# Patient Record
Sex: MALE | Race: WHITE
[De-identification: names, ages, dates, MRNs, and addresses within clinical notes are randomized per-mention and may not be internally consistent; named-entity substitution may affect disease eponyms.]

---

## 2017-01-19 ENCOUNTER — APPOINTMENT (OUTPATIENT)
Dept: INTERNAL MEDICINE | Facility: CLINIC | Age: 62
End: 2017-01-19

## 2017-03-20 ENCOUNTER — APPOINTMENT (OUTPATIENT)
Dept: INTERNAL MEDICINE | Facility: CLINIC | Age: 62
End: 2017-03-20

## 2017-06-12 ENCOUNTER — APPOINTMENT (OUTPATIENT)
Dept: INTERNAL MEDICINE | Facility: CLINIC | Age: 62
End: 2017-06-12

## 2017-09-18 ENCOUNTER — APPOINTMENT (OUTPATIENT)
Dept: INTERNAL MEDICINE | Facility: CLINIC | Age: 62
End: 2017-09-18
Payer: MEDICARE

## 2017-09-18 PROCEDURE — 90686 IIV4 VACC NO PRSV 0.5 ML IM: CPT

## 2017-09-18 PROCEDURE — G0008: CPT

## 2017-09-18 PROCEDURE — G0009: CPT

## 2017-09-18 PROCEDURE — 90670 PCV13 VACCINE IM: CPT

## 2017-09-18 PROCEDURE — 99214 OFFICE O/P EST MOD 30 MIN: CPT | Mod: 25

## 2017-12-22 ENCOUNTER — APPOINTMENT (OUTPATIENT)
Dept: INTERNAL MEDICINE | Facility: CLINIC | Age: 62
End: 2017-12-22
Payer: MEDICARE

## 2017-12-22 PROCEDURE — 99214 OFFICE O/P EST MOD 30 MIN: CPT | Mod: 25

## 2017-12-22 PROCEDURE — 36415 COLL VENOUS BLD VENIPUNCTURE: CPT

## 2018-03-12 ENCOUNTER — APPOINTMENT (OUTPATIENT)
Dept: INTERNAL MEDICINE | Facility: CLINIC | Age: 63
End: 2018-03-12
Payer: MEDICARE

## 2018-03-12 PROCEDURE — 36415 COLL VENOUS BLD VENIPUNCTURE: CPT

## 2018-03-12 PROCEDURE — 99214 OFFICE O/P EST MOD 30 MIN: CPT | Mod: 25

## 2018-06-11 ENCOUNTER — LABORATORY RESULT (OUTPATIENT)
Age: 63
End: 2018-06-11

## 2018-06-11 ENCOUNTER — APPOINTMENT (OUTPATIENT)
Dept: INTERNAL MEDICINE | Facility: CLINIC | Age: 63
End: 2018-06-11
Payer: MEDICARE

## 2018-06-11 PROCEDURE — 99214 OFFICE O/P EST MOD 30 MIN: CPT | Mod: 25

## 2018-06-11 PROCEDURE — 36415 COLL VENOUS BLD VENIPUNCTURE: CPT

## 2018-08-07 ENCOUNTER — MEDICATION RENEWAL (OUTPATIENT)
Age: 63
End: 2018-08-07

## 2018-08-09 ENCOUNTER — EMERGENCY (EMERGENCY)
Facility: HOSPITAL | Age: 63
LOS: 1 days | Discharge: HOME | End: 2018-08-09
Attending: EMERGENCY MEDICINE
Payer: MEDICARE

## 2018-08-09 ENCOUNTER — RX CHANGE (OUTPATIENT)
Age: 63
End: 2018-08-09

## 2018-08-09 VITALS
RESPIRATION RATE: 18 BRPM | WEIGHT: 199.96 LBS | TEMPERATURE: 98 F | HEIGHT: 74 IN | OXYGEN SATURATION: 100 % | DIASTOLIC BLOOD PRESSURE: 79 MMHG | SYSTOLIC BLOOD PRESSURE: 124 MMHG | HEART RATE: 76 BPM

## 2018-08-09 PROCEDURE — 99283 EMERGENCY DEPT VISIT LOW MDM: CPT

## 2018-08-09 RX ORDER — VALACYCLOVIR 500 MG/1
1 TABLET, FILM COATED ORAL
Qty: 21 | Refills: 0 | OUTPATIENT
Start: 2018-08-09 | End: 2018-08-15

## 2018-08-09 NOTE — ED ADULT TRIAGE NOTE - CHIEF COMPLAINT QUOTE
States called PMD with pain and rash on back and under right arm pit. Denies fevers and chills. Reports non-hodgkins lymphoma.

## 2018-08-10 ENCOUNTER — RECORD ABSTRACTING (OUTPATIENT)
Age: 63
End: 2018-08-10

## 2018-08-10 DIAGNOSIS — Z78.9 OTHER SPECIFIED HEALTH STATUS: ICD-10-CM

## 2018-08-10 DIAGNOSIS — Z87.891 PERSONAL HISTORY OF NICOTINE DEPENDENCE: ICD-10-CM

## 2018-08-10 DIAGNOSIS — Z86.73 PERSONAL HISTORY OF TRANSIENT ISCHEMIC ATTACK (TIA), AND CEREBRAL INFARCTION W/OUT RESIDUAL DEFICITS: ICD-10-CM

## 2018-08-10 DIAGNOSIS — R21 RASH AND OTHER NONSPECIFIC SKIN ERUPTION: ICD-10-CM

## 2018-08-10 RX ORDER — MONTELUKAST SODIUM 10 MG/1
10 TABLET, FILM COATED ORAL DAILY
Refills: 0 | Status: ACTIVE | COMMUNITY

## 2018-08-15 ENCOUNTER — APPOINTMENT (OUTPATIENT)
Dept: INTERNAL MEDICINE | Facility: CLINIC | Age: 63
End: 2018-08-15
Payer: MEDICARE

## 2018-08-15 VITALS
DIASTOLIC BLOOD PRESSURE: 70 MMHG | BODY MASS INDEX: 25.54 KG/M2 | HEIGHT: 74 IN | WEIGHT: 199 LBS | SYSTOLIC BLOOD PRESSURE: 120 MMHG

## 2018-08-15 PROCEDURE — 99214 OFFICE O/P EST MOD 30 MIN: CPT

## 2018-08-15 NOTE — PHYSICAL EXAM
[Normal] : normal gait, coordination grossly intact, no focal deficits [de-identified] : RIGHT DERMATOMAL ZOSTER

## 2018-08-15 NOTE — PLAN
[FreeTextEntry1] : Patient is a 62-year-old chronic pain  severe osteoarthritis- history of CVA n the past history of CLL now without shingles he went to emergency room and was given Valtrex clinically appears to be healing and scabbing over no active draining vesicles are present will complete Valtrex and followup next week. PT IS  anxious  has an upcoming [employer]

## 2018-08-15 NOTE — HISTORY OF PRESENT ILLNESS
[FreeTextEntry1] : F/U  on Shingles [de-identified] : PT HERE FOR FOLLOW UP HAD SHINGLES LAST THURSDAY  WENT TO ER AND WAS GIVEN VALTREX \par HERE FOR FOLLOW UP\par FEELS BETTER

## 2018-08-17 NOTE — ED PROVIDER NOTE - OBJECTIVE STATEMENT
63 yo male pmh lymphoma comes to ed with rash on rt side of chest , axilla and back; denies fever , chills, nausea , vomting and sob

## 2018-08-22 ENCOUNTER — APPOINTMENT (OUTPATIENT)
Dept: INTERNAL MEDICINE | Facility: CLINIC | Age: 63
End: 2018-08-22
Payer: MEDICARE

## 2018-08-22 VITALS
HEIGHT: 74 IN | WEIGHT: 197 LBS | DIASTOLIC BLOOD PRESSURE: 84 MMHG | HEART RATE: 63 BPM | BODY MASS INDEX: 25.28 KG/M2 | SYSTOLIC BLOOD PRESSURE: 145 MMHG

## 2018-08-22 PROCEDURE — 99213 OFFICE O/P EST LOW 20 MIN: CPT

## 2018-08-22 NOTE — PHYSICAL EXAM
[Normal] : normal gait, coordination grossly intact, no focal deficits [de-identified] : ZOSTER RASH HEALING DRYING

## 2018-08-22 NOTE — PLAN
[FreeTextEntry1] : Patient is a 62-year-old chronic pain  severe osteoarthritis- history of CVA n the past history of CLL now without shingles he went to emergency room and was given Valtrex clinically appears to be healing and scabbing over no active draining vesicles are present has completed without oral lesions or scaling over

## 2018-08-22 NOTE — HISTORY OF PRESENT ILLNESS
[FreeTextEntry1] : recheck for shingles [de-identified] : PT HERE FOR FOLLOW UP HAD SHINGLES LAST THURSDAY  WENT TO ER AND WAS GIVEN VALTREX \par HERE FOR FOLLOW UP\par FEELS BETTER rash resolving

## 2018-09-20 ENCOUNTER — APPOINTMENT (OUTPATIENT)
Dept: INTERNAL MEDICINE | Facility: CLINIC | Age: 63
End: 2018-09-20
Payer: MEDICARE

## 2018-09-20 VITALS
WEIGHT: 199 LBS | DIASTOLIC BLOOD PRESSURE: 85 MMHG | BODY MASS INDEX: 25.54 KG/M2 | HEIGHT: 74 IN | SYSTOLIC BLOOD PRESSURE: 120 MMHG

## 2018-09-20 DIAGNOSIS — B02.9 ZOSTER W/OUT COMPLICATIONS: ICD-10-CM

## 2018-09-20 PROCEDURE — 90686 IIV4 VACC NO PRSV 0.5 ML IM: CPT

## 2018-09-20 PROCEDURE — G0008: CPT

## 2018-09-20 PROCEDURE — 99214 OFFICE O/P EST MOD 30 MIN: CPT | Mod: 25

## 2018-09-20 NOTE — PLAN
[FreeTextEntry1] : Patient is a 62-year-old chronic pain  severe osteoarthritis- history of CVA n the past history of CLL  SEES ONCOLOGY\par OVERALL IMPROVED MINIMAL PAIN OF PHN\par FLU SHOT GIVEN HIGH DOSE

## 2018-09-20 NOTE — HISTORY OF PRESENT ILLNESS
[FreeTextEntry1] : F/U on B/P & Shingles [de-identified] : PT HERE FOR FOLLOW UP FEELS OK NO MAJOR COMPLAINTS \par PAIN FORM SHINGLES MINIMAL \par HAS APPTS WITH ONCOLOGISTS UPCOMING\par PAIN UNDER CONTROL ON PAIN MEDS

## 2018-10-31 ENCOUNTER — MEDICATION RENEWAL (OUTPATIENT)
Age: 63
End: 2018-10-31

## 2018-11-02 RX ORDER — LOSARTAN POTASSIUM 100 MG/1
100 TABLET, FILM COATED ORAL DAILY
Qty: 90 | Refills: 3 | Status: COMPLETED | COMMUNITY
End: 2018-11-02

## 2018-12-21 ENCOUNTER — APPOINTMENT (OUTPATIENT)
Dept: INTERNAL MEDICINE | Facility: CLINIC | Age: 63
End: 2018-12-21
Payer: MEDICARE

## 2018-12-21 PROCEDURE — 36415 COLL VENOUS BLD VENIPUNCTURE: CPT

## 2018-12-21 PROCEDURE — 99214 OFFICE O/P EST MOD 30 MIN: CPT | Mod: 25

## 2018-12-22 NOTE — PLAN
[FreeTextEntry1] : Patient is a 62-year-old chronic pain  severe osteoarthritis- history of CVA n the past history of CLL  SEES ONCOLOGY\par OVERALL IMPROVED MINIMAL PAIN OF PHN\par LAB WORK FROM ONCOLOGIST REVIEWED WITH PT AND FSG \par WILL CHECK LABS NOT DOEN BY ONC\par WILL FOLLOW UP

## 2018-12-22 NOTE — HISTORY OF PRESENT ILLNESS
[FreeTextEntry1] : FOLLOW UP BP  [de-identified] : PT HERE FOR FOLLOW UP FEELS OK NO MAJOR COMPLAINTS \par PAIN FORM SHINGLESRESOLVED \par HAD  APPTS WITH ONCOLOGISTS AND BGINGS IN LABS AND ALSO FINGERSTICKS \par PAIN UNDER CONTROL ON PAIN MEDS NOT DUE TILL FEBRUARY

## 2018-12-28 ENCOUNTER — MEDICATION RENEWAL (OUTPATIENT)
Age: 63
End: 2018-12-28

## 2018-12-31 ENCOUNTER — MEDICATION RENEWAL (OUTPATIENT)
Age: 63
End: 2018-12-31

## 2019-01-13 LAB
25(OH)D3 SERPL-MCNC: 15 NG/ML
CHOLEST SERPL-MCNC: 117 MG/DL
CHOLEST/HDLC SERPL: 4 RATIO
HBA1C MFR BLD HPLC: 6.4 %
HDLC SERPL-MCNC: 29 MG/DL
LDLC SERPL CALC-MCNC: 60 MG/DL
TRIGL SERPL-MCNC: 139 MG/DL

## 2019-01-14 ENCOUNTER — MED ADMIN CHARGE (OUTPATIENT)
Age: 64
End: 2019-01-14

## 2019-01-28 ENCOUNTER — MEDICATION RENEWAL (OUTPATIENT)
Age: 64
End: 2019-01-28

## 2019-03-05 ENCOUNTER — APPOINTMENT (OUTPATIENT)
Dept: INTERNAL MEDICINE | Facility: CLINIC | Age: 64
End: 2019-03-05
Payer: MEDICARE

## 2019-03-05 VITALS
SYSTOLIC BLOOD PRESSURE: 140 MMHG | DIASTOLIC BLOOD PRESSURE: 80 MMHG | WEIGHT: 200 LBS | BODY MASS INDEX: 25.67 KG/M2 | HEIGHT: 74 IN

## 2019-03-05 PROCEDURE — 36415 COLL VENOUS BLD VENIPUNCTURE: CPT

## 2019-03-05 PROCEDURE — 99214 OFFICE O/P EST MOD 30 MIN: CPT | Mod: 25

## 2019-03-05 NOTE — HISTORY OF PRESENT ILLNESS
[FreeTextEntry1] : F/U  on  B/P  &  other Medical Issues [de-identified] : PT HERE FOR FLLOW UP FEELS K SEES ONCOLOGIST HAD SOME LABS THERE PT WITH CHRONIC PAIN FROM DJD AND HAS BEEN ON CHRONIC OPIATES HE DOESNT ABUSE THEM AND NO RELIEF FORM PAIN MANAGEMENT \par OVERALL FEELING OK BRINGS IN GLUCOSE LOG \par

## 2019-03-05 NOTE — PLAN
[FreeTextEntry1] : Patient is a 62-year-old chronic pain  severe osteoarthritis- history of CVA n the past history of CLL  SEES ONCOLOGY\par OVERALL IMPROVED \par LAB WORK FROM ONCOLOGIST REVIEWED WITH PT AND FSG \par WILL CHECK LABS NOT DONE  BY ONC\par PT ON CHRONIC PAIN MEDS VICODIN HE DOESN'T ABUSE  THEM  AND HE SUFFESR FROM CHRONIC DJD

## 2019-03-12 LAB
25(OH)D3 SERPL-MCNC: 47.5 NG/ML
CHOLEST SERPL-MCNC: 135 MG/DL
CHOLEST/HDLC SERPL: 4.4 RATIO
HBA1C MFR BLD HPLC: 6.2 %
HDLC SERPL-MCNC: 31 MG/DL
LDLC SERPL CALC-MCNC: 80 MG/DL
TRIGL SERPL-MCNC: 121 MG/DL

## 2019-04-02 ENCOUNTER — APPOINTMENT (OUTPATIENT)
Dept: INTERNAL MEDICINE | Facility: CLINIC | Age: 64
End: 2019-04-02
Payer: MEDICARE

## 2019-04-02 VITALS
SYSTOLIC BLOOD PRESSURE: 130 MMHG | BODY MASS INDEX: 25.67 KG/M2 | WEIGHT: 200 LBS | DIASTOLIC BLOOD PRESSURE: 80 MMHG | HEIGHT: 74 IN

## 2019-04-02 DIAGNOSIS — R10.9 UNSPECIFIED ABDOMINAL PAIN: ICD-10-CM

## 2019-04-02 PROCEDURE — 99214 OFFICE O/P EST MOD 30 MIN: CPT

## 2019-04-02 NOTE — PLAN
[FreeTextEntry1] : Patient is a 62-year-old chronic pain  severe osteoarthritis- history of CVA n the past history of CLL  SEES ONCOLOGY\par OVERALL IMPROVED \par HAS PAIN AND SWELLING BEHIND RIGHT EAR\par MILD ERYTHEMA  MAY BE FOLLICULITIS WITH REACTIVE LN BUT WITH HX OF CLL WILL NEED TO SEE IF IT RESPONDS TO ABX THERAPY AND WARM COMPRESSES  FOLLOW UP NEXT WEEK IF NO CHANGE SEE ONCOLOGIST AS POTENTIAL FOR LYMPHOMA

## 2019-04-02 NOTE — PHYSICAL EXAM
[No Acute Distress] : no acute distress [Well Nourished] : well nourished [Well Developed] : well developed [Well-Appearing] : well-appearing [Normal Sclera/Conjunctiva] : normal sclera/conjunctiva [PERRL] : pupils equal round and reactive to light [EOMI] : extraocular movements intact [Normal Oropharynx] : the oropharynx was normal [No JVD] : no jugular venous distention [Supple] : supple [No Lymphadenopathy] : no lymphadenopathy [Thyroid Normal, No Nodules] : the thyroid was normal and there were no nodules present [No Respiratory Distress] : no respiratory distress  [Clear to Auscultation] : lungs were clear to auscultation bilaterally [No Accessory Muscle Use] : no accessory muscle use [Normal Rate] : normal rate  [Regular Rhythm] : with a regular rhythm [Normal S1, S2] : normal S1 and S2 [No Murmur] : no murmur heard [No Carotid Bruits] : no carotid bruits [No Abdominal Bruit] : a ~M bruit was not heard ~T in the abdomen [No Varicosities] : no varicosities [Pedal Pulses Present] : the pedal pulses are present [No Edema] : there was no peripheral edema [No Extremity Clubbing/Cyanosis] : no extremity clubbing/cyanosis [No Palpable Aorta] : no palpable aorta [Soft] : abdomen soft [Non Tender] : non-tender [Non-distended] : non-distended [No Masses] : no abdominal mass palpated [No HSM] : no HSM [Normal Bowel Sounds] : normal bowel sounds [Normal Posterior Cervical Nodes] : no posterior cervical lymphadenopathy [Normal Anterior Cervical Nodes] : no anterior cervical lymphadenopathy [No CVA Tenderness] : no CVA  tenderness [No Spinal Tenderness] : no spinal tenderness [No Joint Swelling] : no joint swelling [Grossly Normal Strength/Tone] : grossly normal strength/tone [No Rash] : no rash [Normal Gait] : normal gait [Coordination Grossly Intact] : coordination grossly intact [No Focal Deficits] : no focal deficits [Deep Tendon Reflexes (DTR)] : deep tendon reflexes were 2+ and symmetric [Normal Affect] : the affect was normal [Normal Insight/Judgement] : insight and judgment were intact [de-identified] : POSTERIOR RIGHT EAR WITH EDMEA ?LN MILD ERYTHEMA

## 2019-04-11 ENCOUNTER — APPOINTMENT (OUTPATIENT)
Dept: INTERNAL MEDICINE | Facility: CLINIC | Age: 64
End: 2019-04-11
Payer: MEDICARE

## 2019-04-11 PROCEDURE — 99213 OFFICE O/P EST LOW 20 MIN: CPT

## 2019-04-12 NOTE — PLAN
[FreeTextEntry1] : Patient is a 62-year-old chronic pain  severe osteoarthritis- history of CVA n the past history of CLL  SEES ONCOLOGY\par OVERALL IMPROVED \par HAD PAIN AND SWELLING BEHIND RIGHT EAR\par COMPLETED ABX AND WARM COMPRESSES WITH ALMOST RESOLTUION\par HE SAW ONCOLOGIST DIDNT FEEL IT WAS RELATED TO CLL\par OVERALL IMPROVED  FOLLOW UP PRN

## 2019-04-12 NOTE — HISTORY OF PRESENT ILLNESS
[de-identified] : PT HERE FOR FOLLOWUP HAD COMPLAINTS OF BUMP BEHIND RIGHT EAR  DEVELOPED  AGO NO TRAUMA THOUGHTIT MIGHT BE SPIDER BITE TREATED WITH ORAL DURICEF HER FOR FOLLOW UP  [FreeTextEntry1] : LEFT EAR SWELLING

## 2019-05-03 ENCOUNTER — OUTPATIENT (OUTPATIENT)
Dept: OUTPATIENT SERVICES | Facility: HOSPITAL | Age: 64
LOS: 1 days | End: 2019-05-03
Payer: MEDICARE

## 2019-05-03 ENCOUNTER — APPOINTMENT (OUTPATIENT)
Dept: RADIOLOGY | Facility: CLINIC | Age: 64
End: 2019-05-03
Payer: MEDICARE

## 2019-05-03 DIAGNOSIS — M89.9 DISORDER OF BONE, UNSPECIFIED: ICD-10-CM

## 2019-05-03 DIAGNOSIS — C83.05: ICD-10-CM

## 2019-05-03 DIAGNOSIS — M15.0 PRIMARY GENERALIZED (OSTEO)ARTHRITIS: ICD-10-CM

## 2019-05-03 DIAGNOSIS — Z00.00 ENCOUNTER FOR GENERAL ADULT MEDICAL EXAMINATION WITHOUT ABNORMAL FINDINGS: ICD-10-CM

## 2019-05-03 PROCEDURE — 72100 X-RAY EXAM L-S SPINE 2/3 VWS: CPT

## 2019-05-03 PROCEDURE — 72100 X-RAY EXAM L-S SPINE 2/3 VWS: CPT | Mod: 26

## 2019-05-03 PROCEDURE — 77080 DXA BONE DENSITY AXIAL: CPT | Mod: 26

## 2019-05-03 PROCEDURE — 77080 DXA BONE DENSITY AXIAL: CPT

## 2019-06-05 ENCOUNTER — APPOINTMENT (OUTPATIENT)
Dept: INTERNAL MEDICINE | Facility: CLINIC | Age: 64
End: 2019-06-05
Payer: MEDICARE

## 2019-06-05 VITALS
SYSTOLIC BLOOD PRESSURE: 130 MMHG | HEIGHT: 62 IN | WEIGHT: 202 LBS | BODY MASS INDEX: 37.17 KG/M2 | DIASTOLIC BLOOD PRESSURE: 80 MMHG

## 2019-06-05 PROCEDURE — 99214 OFFICE O/P EST MOD 30 MIN: CPT

## 2019-06-05 NOTE — PHYSICAL EXAM
[No Acute Distress] : no acute distress [Well Nourished] : well nourished [Well Developed] : well developed [Well-Appearing] : well-appearing [Normal Sclera/Conjunctiva] : normal sclera/conjunctiva [EOMI] : extraocular movements intact [PERRL] : pupils equal round and reactive to light [Normal Outer Ear/Nose] : the outer ears and nose were normal in appearance [Normal Oropharynx] : the oropharynx was normal [No JVD] : no jugular venous distention [Supple] : supple [Thyroid Normal, No Nodules] : the thyroid was normal and there were no nodules present [No Lymphadenopathy] : no lymphadenopathy [No Respiratory Distress] : no respiratory distress  [Clear to Auscultation] : lungs were clear to auscultation bilaterally [No Accessory Muscle Use] : no accessory muscle use [Normal Rate] : normal rate  [Regular Rhythm] : with a regular rhythm [Normal S1, S2] : normal S1 and S2 [No Murmur] : no murmur heard [No Carotid Bruits] : no carotid bruits [No Abdominal Bruit] : a ~M bruit was not heard ~T in the abdomen [No Varicosities] : no varicosities [Pedal Pulses Present] : the pedal pulses are present [No Edema] : there was no peripheral edema [No Extremity Clubbing/Cyanosis] : no extremity clubbing/cyanosis [No Palpable Aorta] : no palpable aorta [Soft] : abdomen soft [Non Tender] : non-tender [Non-distended] : non-distended [No Masses] : no abdominal mass palpated [No HSM] : no HSM [Normal Posterior Cervical Nodes] : no posterior cervical lymphadenopathy [Normal Bowel Sounds] : normal bowel sounds [No CVA Tenderness] : no CVA  tenderness [Normal Anterior Cervical Nodes] : no anterior cervical lymphadenopathy [No Spinal Tenderness] : no spinal tenderness [No Joint Swelling] : no joint swelling [Grossly Normal Strength/Tone] : grossly normal strength/tone [No Rash] : no rash [Coordination Grossly Intact] : coordination grossly intact [Normal Gait] : normal gait [Normal Affect] : the affect was normal [Deep Tendon Reflexes (DTR)] : deep tendon reflexes were 2+ and symmetric [No Focal Deficits] : no focal deficits [Normal Insight/Judgement] : insight and judgment were intact

## 2019-06-05 NOTE — HISTORY OF PRESENT ILLNESS
[FreeTextEntry1] : Follow up on Glucose & Medical  Evaluation [de-identified] : PT HERE FOR FOLLOWUP  OVERALL FEELING OK HAD LABS LAST VIIST

## 2019-06-05 NOTE — PLAN
[FreeTextEntry1] : Patient is a 62-year-old chronic pain  severe osteoarthritis- history of CVA n the past history of CLL  SEES ONCOLOGY\par OVERALL IMPROVED \par  ON CHRONIC PAIN MEDS FOR DJD DOESNT  ABUSE THEM AND AND FEELS  OK \par  DIABETIC ON DIET \par OVERALL DOING OK WILL CONTINEU PRESENT REGIMEN REVIEWED LABS WITH PT FORM LAST TIME

## 2019-07-24 ENCOUNTER — MEDICATION RENEWAL (OUTPATIENT)
Age: 64
End: 2019-07-24

## 2019-07-25 RX ORDER — BLOOD SUGAR DIAGNOSTIC
STRIP MISCELLANEOUS TWICE DAILY
Qty: 180 | Refills: 3 | Status: DISCONTINUED | COMMUNITY
End: 2019-07-25

## 2019-07-25 RX ORDER — LANCETS
EACH MISCELLANEOUS
Qty: 100 | Refills: 3 | Status: DISCONTINUED | COMMUNITY
End: 2019-07-25

## 2019-07-29 RX ORDER — BLOOD SUGAR DIAGNOSTIC
STRIP MISCELLANEOUS DAILY
Qty: 1 | Refills: 2 | Status: ACTIVE | COMMUNITY
Start: 2019-07-25 | End: 1900-01-01

## 2019-09-11 ENCOUNTER — APPOINTMENT (OUTPATIENT)
Dept: INTERNAL MEDICINE | Facility: CLINIC | Age: 64
End: 2019-09-11
Payer: MEDICARE

## 2019-09-11 VITALS
DIASTOLIC BLOOD PRESSURE: 78 MMHG | BODY MASS INDEX: 25.93 KG/M2 | WEIGHT: 202 LBS | SYSTOLIC BLOOD PRESSURE: 131 MMHG | TEMPERATURE: 66 F | HEIGHT: 74 IN

## 2019-09-11 PROCEDURE — 36415 COLL VENOUS BLD VENIPUNCTURE: CPT

## 2019-09-11 PROCEDURE — 99214 OFFICE O/P EST MOD 30 MIN: CPT | Mod: 25

## 2019-09-11 NOTE — HISTORY OF PRESENT ILLNESS
[FreeTextEntry1] : follow up [de-identified] : PT HERE FOR FOLLOWUP  OVERALL FEELING OK HAD LABS LAST VISIT REVIEWED\par HAS BEEN BACK TO ONCOLOGIST AT [facility] FOR CLL  AND BROUGHT IN SOME LABS \par OVERALL FEELS OK ENJOYING [facility]

## 2019-09-11 NOTE — PLAN
[FreeTextEntry1] : PT HERE FOR FOLLOWUP  OVERALL FEELING OK HAD LABS LAST VISIT REVIEWED\par HAS BEEN BACK TO ONCOLOGIST AT [facility] FOR CLL  AND BROUGHT IN SOME LABS \par OVERALL FEELS OK ENJOYING [employer]SON \par WBRINGS IN FS[place] AT HOME IN [place] ON METFORMN TWICE A DAY\par OVERALL FEELS OK\par ELOISE CHEK HG A1C AND LIPIS ALSO PSA IS DUE\par LABS  DRAWN HERE\par RENEWED PAIN MEDS PT WITH CHRONIC DJD DOESN T ABUSE THEM\par WILL FOLLOW UP\par MAY NEED EVENTUAL KNNEE PROCEDURE WITH DR ROSENTHAL

## 2019-09-24 ENCOUNTER — RESULT REVIEW (OUTPATIENT)
Age: 64
End: 2019-09-24

## 2019-09-24 LAB
25(OH)D3 SERPL-MCNC: 49 NG/ML
CHOLEST SERPL-MCNC: 117 MG/DL
CHOLEST/HDLC SERPL: 3.9 RATIO
ESTIMATED AVERAGE GLUCOSE: 137 MG/DL
HBA1C MFR BLD HPLC: 6.4 %
HDLC SERPL-MCNC: 30 MG/DL
LDLC SERPL CALC-MCNC: 66 MG/DL
PSA SERPL-MCNC: 0.17 NG/ML
TRIGL SERPL-MCNC: 107 MG/DL

## 2019-11-06 ENCOUNTER — APPOINTMENT (OUTPATIENT)
Dept: INTERNAL MEDICINE | Facility: CLINIC | Age: 64
End: 2019-11-06
Payer: MEDICARE

## 2019-11-06 VITALS
BODY MASS INDEX: 25.93 KG/M2 | SYSTOLIC BLOOD PRESSURE: 110 MMHG | HEIGHT: 74 IN | WEIGHT: 202 LBS | DIASTOLIC BLOOD PRESSURE: 70 MMHG

## 2019-11-06 DIAGNOSIS — J06.9 ACUTE UPPER RESPIRATORY INFECTION, UNSPECIFIED: ICD-10-CM

## 2019-11-06 PROCEDURE — 99214 OFFICE O/P EST MOD 30 MIN: CPT

## 2019-11-07 NOTE — PHYSICAL EXAM
[No Acute Distress] : no acute distress [Well Nourished] : well nourished [Well Developed] : well developed [Well-Appearing] : well-appearing [Normal Sclera/Conjunctiva] : normal sclera/conjunctiva [PERRL] : pupils equal round and reactive to light [EOMI] : extraocular movements intact [Normal Outer Ear/Nose] : the outer ears and nose were normal in appearance [Normal Oropharynx] : the oropharynx was normal [No JVD] : no jugular venous distention [No Lymphadenopathy] : no lymphadenopathy [Supple] : supple [Thyroid Normal, No Nodules] : the thyroid was normal and there were no nodules present [No Respiratory Distress] : no respiratory distress  [No Accessory Muscle Use] : no accessory muscle use [Clear to Auscultation] : lungs were clear to auscultation bilaterally [Normal Rate] : normal rate  [Regular Rhythm] : with a regular rhythm [Normal S1, S2] : normal S1 and S2 [No Murmur] : no murmur heard [No Carotid Bruits] : no carotid bruits [No Abdominal Bruit] : a ~M bruit was not heard ~T in the abdomen [No Varicosities] : no varicosities [Pedal Pulses Present] : the pedal pulses are present [No Edema] : there was no peripheral edema [No Palpable Aorta] : no palpable aorta [No Extremity Clubbing/Cyanosis] : no extremity clubbing/cyanosis [Soft] : abdomen soft [Non Tender] : non-tender [Non-distended] : non-distended [No Masses] : no abdominal mass palpated [No HSM] : no HSM [Normal Bowel Sounds] : normal bowel sounds [Normal Posterior Cervical Nodes] : no posterior cervical lymphadenopathy [Normal Anterior Cervical Nodes] : no anterior cervical lymphadenopathy [No CVA Tenderness] : no CVA  tenderness [No Spinal Tenderness] : no spinal tenderness [No Joint Swelling] : no joint swelling [Grossly Normal Strength/Tone] : grossly normal strength/tone [No Rash] : no rash [Coordination Grossly Intact] : coordination grossly intact [No Focal Deficits] : no focal deficits [Normal Gait] : normal gait [Deep Tendon Reflexes (DTR)] : deep tendon reflexes were 2+ and symmetric [Normal Affect] : the affect was normal [Normal Insight/Judgement] : insight and judgment were intact [de-identified] : POST NASAL DRIP SEEN [de-identified] : POSITIVE MOBILE RIGHT SUPRACLAVICULAR LN

## 2019-11-07 NOTE — HISTORY OF PRESENT ILLNESS
[FreeTextEntry1] : Cough For  over a Week [de-identified] : PT WTH RECENT SORE THROAT URI WAS PLACED ON ABX HERE FOR FOLLOW UP FEELS BETTER BUT HE IS CONCERNED  BECAUSE OF HIS HISTORY OF LYMPHOMA

## 2019-11-07 NOTE — PLAN
[FreeTextEntry1] : PT WTH RECENT SORE THROAT URI WAS PLACED ON ABX HERE FOR FOLLOW UP FEELS BETTER BUT HE IS CONCERNED  BECAUSE OF HIS HISTORY OF LYMPHOMA NO CERVICAL LN SOME POST NASAL DRIP DEFER FURTHER ABX \par PHYSICAL EXAM BENIGN DOES HAVE LN MOBILE IN RIGHT SUPRACLAVICULAR REGION AND TO FOLLO WP WITH ONCOLOGY

## 2019-11-18 ENCOUNTER — OUTPATIENT (OUTPATIENT)
Dept: OUTPATIENT SERVICES | Facility: HOSPITAL | Age: 64
LOS: 1 days | End: 2019-11-18
Payer: MEDICARE

## 2019-11-18 ENCOUNTER — APPOINTMENT (OUTPATIENT)
Dept: RADIOLOGY | Facility: CLINIC | Age: 64
End: 2019-11-18
Payer: MEDICARE

## 2019-11-18 DIAGNOSIS — M15.0 PRIMARY GENERALIZED (OSTEO)ARTHRITIS: ICD-10-CM

## 2019-11-18 PROCEDURE — 73560 X-RAY EXAM OF KNEE 1 OR 2: CPT

## 2019-11-18 PROCEDURE — 73560 X-RAY EXAM OF KNEE 1 OR 2: CPT | Mod: 26,50

## 2019-12-05 ENCOUNTER — APPOINTMENT (OUTPATIENT)
Dept: INTERNAL MEDICINE | Facility: CLINIC | Age: 64
End: 2019-12-05
Payer: MEDICARE

## 2019-12-05 VITALS
BODY MASS INDEX: 27.36 KG/M2 | DIASTOLIC BLOOD PRESSURE: 80 MMHG | HEIGHT: 72 IN | SYSTOLIC BLOOD PRESSURE: 120 MMHG | WEIGHT: 202 LBS

## 2019-12-05 DIAGNOSIS — Z23 ENCOUNTER FOR IMMUNIZATION: ICD-10-CM

## 2019-12-05 PROCEDURE — G0009: CPT

## 2019-12-05 PROCEDURE — 90732 PPSV23 VACC 2 YRS+ SUBQ/IM: CPT

## 2019-12-05 PROCEDURE — 99214 OFFICE O/P EST MOD 30 MIN: CPT | Mod: 25

## 2019-12-05 NOTE — PHYSICAL EXAM
[No Acute Distress] : no acute distress [Well Nourished] : well nourished [Well Developed] : well developed [Well-Appearing] : well-appearing [Normal Sclera/Conjunctiva] : normal sclera/conjunctiva [PERRL] : pupils equal round and reactive to light [EOMI] : extraocular movements intact [Normal Outer Ear/Nose] : the outer ears and nose were normal in appearance [Normal Oropharynx] : the oropharynx was normal [No JVD] : no jugular venous distention [No Lymphadenopathy] : no lymphadenopathy [Supple] : supple [Thyroid Normal, No Nodules] : the thyroid was normal and there were no nodules present [No Respiratory Distress] : no respiratory distress  [No Accessory Muscle Use] : no accessory muscle use [Clear to Auscultation] : lungs were clear to auscultation bilaterally [Normal Rate] : normal rate  [Regular Rhythm] : with a regular rhythm [Normal S1, S2] : normal S1 and S2 [No Murmur] : no murmur heard [No Carotid Bruits] : no carotid bruits [No Abdominal Bruit] : a ~M bruit was not heard ~T in the abdomen [No Varicosities] : no varicosities [No Edema] : there was no peripheral edema [Pedal Pulses Present] : the pedal pulses are present [No Palpable Aorta] : no palpable aorta [No Extremity Clubbing/Cyanosis] : no extremity clubbing/cyanosis [Soft] : abdomen soft [Non-distended] : non-distended [Non Tender] : non-tender [No HSM] : no HSM [Normal Bowel Sounds] : normal bowel sounds [No Masses] : no abdominal mass palpated [No CVA Tenderness] : no CVA  tenderness [Normal Posterior Cervical Nodes] : no posterior cervical lymphadenopathy [No Spinal Tenderness] : no spinal tenderness [Grossly Normal Strength/Tone] : grossly normal strength/tone [No Joint Swelling] : no joint swelling [Coordination Grossly Intact] : coordination grossly intact [No Rash] : no rash [Deep Tendon Reflexes (DTR)] : deep tendon reflexes were 2+ and symmetric [Normal Gait] : normal gait [No Focal Deficits] : no focal deficits [Normal Affect] : the affect was normal [Normal Insight/Judgement] : insight and judgment were intact [de-identified] : POST NASAL DRIP SEEN [de-identified] : POSITIVE MOBILE RIGHT SUPRACLAVICULAR LN

## 2019-12-05 NOTE — PLAN
[FreeTextEntry1] :  PT HERE FOR FOLLOW UP HX OF  CVA CLL ALSO WITH CHRONIC PAIN   DJD ARTHRITIS ON VICODIN FOR YEARS IS NOT ABUSING THEM  SEE RHEUMATOLOGIST \par PHYSICAL EXAM BENIGN DOES HAVE LN MOBILE IN RIGHT SUPRACLAVICULAR REGION AND TO FOLLO WP WITH ONCOLOGY\par WILL CHECK LABS  NEXT TIME\par BRINGS IN LAST LABS FORM ONCOLOGISTS\par WILL HAVE REPEAT SCANS NEXT MONTH TO FURHTER EVAL THE LN ON RIGHT SIDE \par BriNgs IN [place]\par PNEUMOVAX 23 GIVEN TODAY\par

## 2019-12-05 NOTE — HISTORY OF PRESENT ILLNESS
[de-identified] : PT HERE FOR FOLLOW UP FEELS OK HAD LN IN RIGHT SUPRACLAVICULAR AREA WHICH  IS STILL  PRESENT BUT HAS Decreased IN SIZE IT IS MOBILE SOFT NON TENDER\par OVERAL FEELS OK [FreeTextEntry1] : Follow Up On  B/P  &  other Medical Problems

## 2020-01-06 ENCOUNTER — MEDICATION RENEWAL (OUTPATIENT)
Age: 65
End: 2020-01-06

## 2020-03-12 ENCOUNTER — APPOINTMENT (OUTPATIENT)
Dept: INTERNAL MEDICINE | Facility: CLINIC | Age: 65
End: 2020-03-12
Payer: MEDICARE

## 2020-03-12 VITALS
WEIGHT: 203 LBS | DIASTOLIC BLOOD PRESSURE: 80 MMHG | SYSTOLIC BLOOD PRESSURE: 130 MMHG | BODY MASS INDEX: 27.5 KG/M2 | HEIGHT: 72 IN

## 2020-03-12 DIAGNOSIS — E78.1 PURE HYPERGLYCERIDEMIA: ICD-10-CM

## 2020-03-12 PROCEDURE — 99214 OFFICE O/P EST MOD 30 MIN: CPT | Mod: 25

## 2020-03-12 PROCEDURE — 36415 COLL VENOUS BLD VENIPUNCTURE: CPT

## 2020-03-12 RX ORDER — METFORMIN HYDROCHLORIDE 500 MG/1
500 TABLET, COATED ORAL TWICE DAILY
Qty: 180 | Refills: 3 | Status: DISCONTINUED | COMMUNITY
Start: 2019-06-11 | End: 2020-03-12

## 2020-03-12 NOTE — PLAN
[FreeTextEntry1] :  PT HERE FOR FOLLOW UP HX OF  CVA CLL ALSO WITH CHRONIC PAIN   DJD ARTHRITIS ON VICODIN FOR YEARS IS NOT ABUSING THEM  SEE RHEUMATOLOGIST \par  \par WILL CHECK LABS  E\par BRINGS IN LAST  CT FROM ONCOLOGISTS\par OFF METFORMIN WILL CHECK HGA1C \par Bri[place]gs IN [place]\par  WILL CHECK LABS \par PT HAS SEEN  PAIN MANGEMENT IN THE PAST DOES NOT ABUSE THE PAIN MEDS\par WILL D/W PT FURHTER\par

## 2020-03-12 NOTE — HISTORY OF PRESENT ILLNESS
[FreeTextEntry1] : Follow up on B/P  & other Medical Problems [de-identified] : PT HERE FOR FOLLOW UP FEELS OK  NO CP HAS SEEN [facility]COL[facility] \par AND HAD CT IN JANUARY\par  OVERALL FEELS WELL

## 2020-04-07 LAB
CHOLEST SERPL-MCNC: 128 MG/DL
CHOLEST/HDLC SERPL: 4.3 RATIO
ESTIMATED AVERAGE GLUCOSE: 151 MG/DL
HBA1C MFR BLD HPLC: 6.9 %
HDLC SERPL-MCNC: 30 MG/DL
LDLC SERPL CALC-MCNC: 79 MG/DL
TRIGL SERPL-MCNC: 96 MG/DL

## 2020-06-11 ENCOUNTER — APPOINTMENT (OUTPATIENT)
Dept: INTERNAL MEDICINE | Facility: CLINIC | Age: 65
End: 2020-06-11
Payer: MEDICARE

## 2020-06-11 VITALS
DIASTOLIC BLOOD PRESSURE: 70 MMHG | HEIGHT: 72 IN | TEMPERATURE: 98.9 F | BODY MASS INDEX: 27.22 KG/M2 | SYSTOLIC BLOOD PRESSURE: 140 MMHG | WEIGHT: 201 LBS

## 2020-06-11 DIAGNOSIS — E55.9 VITAMIN D DEFICIENCY, UNSPECIFIED: ICD-10-CM

## 2020-06-11 PROCEDURE — 99214 OFFICE O/P EST MOD 30 MIN: CPT | Mod: 25

## 2020-06-11 PROCEDURE — 36415 COLL VENOUS BLD VENIPUNCTURE: CPT

## 2020-06-11 NOTE — HISTORY OF PRESENT ILLNESS
[FreeTextEntry1] : follow up on bp and chronic pain [de-identified] : PT HERE FOR FOLLOW UP FEELS OK  NO CP HAS SEEN [facility] ONCOLOGIST \par HAS CHRONIC PAIN HAS RECENT INJECTION IN KNEE\par  OVERALL FEELS WELL HAS NOT HAD COVID SX AND FEELING OK

## 2020-06-11 NOTE — PLAN
[FreeTextEntry1] :  PT HERE FOR FOLLOW UP HX OF  CVA CLL ALSO WITH CHRONIC PAIN   DJD ARTHRITIS ON VICODIN FOR YEARS IS NOT ABUSING THEM  SEE RHEUMATOLOGIST \par NEEDS RENEWAL TODAY\par  LAST TIME A1C ELEVATED \par   HAS BEEN \par OFF METFORMIN FOR A WHILE\par HAS STOPPE DCHECKING FSG\par  WILL CHECK LABS \par PT HAS SEEN  PAIN MANGEMENT IN THE PAST DOES NOT ABUSE THE PAIN MEDS\par UNALBE TO TAPER HAS BEEN ON FOR YEARS PRIOR TO ME PRESCRIBING THEM\par AND DOESN'T ASK FOR EXTRA \par

## 2020-06-11 NOTE — PHYSICAL EXAM
[No Acute Distress] : no acute distress [Well Nourished] : well nourished [Well Developed] : well developed [Normal Sclera/Conjunctiva] : normal sclera/conjunctiva [Well-Appearing] : well-appearing [EOMI] : extraocular movements intact [PERRL] : pupils equal round and reactive to light [Normal Oropharynx] : the oropharynx was normal [Normal Outer Ear/Nose] : the outer ears and nose were normal in appearance [No JVD] : no jugular venous distention [No Lymphadenopathy] : no lymphadenopathy [No Respiratory Distress] : no respiratory distress  [Thyroid Normal, No Nodules] : the thyroid was normal and there were no nodules present [Supple] : supple [Clear to Auscultation] : lungs were clear to auscultation bilaterally [No Accessory Muscle Use] : no accessory muscle use [Normal Rate] : normal rate  [Regular Rhythm] : with a regular rhythm [Normal S1, S2] : normal S1 and S2 [No Murmur] : no murmur heard [No Carotid Bruits] : no carotid bruits [No Varicosities] : no varicosities [No Abdominal Bruit] : a ~M bruit was not heard ~T in the abdomen [Pedal Pulses Present] : the pedal pulses are present [No Edema] : there was no peripheral edema [No Palpable Aorta] : no palpable aorta [No Extremity Clubbing/Cyanosis] : no extremity clubbing/cyanosis [Soft] : abdomen soft [Non Tender] : non-tender [Non-distended] : non-distended [No HSM] : no HSM [No Masses] : no abdominal mass palpated [Normal Bowel Sounds] : normal bowel sounds [Normal Posterior Cervical Nodes] : no posterior cervical lymphadenopathy [No CVA Tenderness] : no CVA  tenderness [Normal Anterior Cervical Nodes] : no anterior cervical lymphadenopathy [No Joint Swelling] : no joint swelling [No Spinal Tenderness] : no spinal tenderness [Grossly Normal Strength/Tone] : grossly normal strength/tone [No Rash] : no rash [Coordination Grossly Intact] : coordination grossly intact [Normal Gait] : normal gait [No Focal Deficits] : no focal deficits [Deep Tendon Reflexes (DTR)] : deep tendon reflexes were 2+ and symmetric [Normal Affect] : the affect was normal [Normal Insight/Judgement] : insight and judgment were intact

## 2020-06-14 LAB
25(OH)D3 SERPL-MCNC: 58.7 NG/ML
ALBUMIN SERPL ELPH-MCNC: 5.3 G/DL
ALP BLD-CCNC: 68 U/L
ALT SERPL-CCNC: 44 U/L
ANION GAP SERPL CALC-SCNC: 15 MMOL/L
AST SERPL-CCNC: 37 U/L
BASOPHILS # BLD AUTO: 0.05 K/UL
BASOPHILS NFR BLD AUTO: 1 %
BILIRUB SERPL-MCNC: 0.7 MG/DL
BUN SERPL-MCNC: 22 MG/DL
CALCIUM SERPL-MCNC: 10.3 MG/DL
CHLORIDE SERPL-SCNC: 102 MMOL/L
CHOLEST SERPL-MCNC: 135 MG/DL
CHOLEST/HDLC SERPL: 4.6 RATIO
CO2 SERPL-SCNC: 21 MMOL/L
CREAT SERPL-MCNC: 0.81 MG/DL
EOSINOPHIL # BLD AUTO: 0.25 K/UL
EOSINOPHIL NFR BLD AUTO: 4.9 %
ESTIMATED AVERAGE GLUCOSE: 206 MG/DL
GLUCOSE SERPL-MCNC: 194 MG/DL
HBA1C MFR BLD HPLC: 8.8 %
HCT VFR BLD CALC: 35.9 %
HDLC SERPL-MCNC: 29 MG/DL
HGB BLD-MCNC: 12.1 G/DL
IMM GRANULOCYTES NFR BLD AUTO: 1.7 %
LDLC SERPL CALC-MCNC: 74 MG/DL
LYMPHOCYTES # BLD AUTO: 1.63 K/UL
LYMPHOCYTES NFR BLD AUTO: 31.7 %
MAN DIFF?: NORMAL
MCHC RBC-ENTMCNC: 30.5 PG
MCHC RBC-ENTMCNC: 33.7 GM/DL
MCV RBC AUTO: 90.4 FL
MONOCYTES # BLD AUTO: 0.4 K/UL
MONOCYTES NFR BLD AUTO: 7.8 %
NEUTROPHILS # BLD AUTO: 2.73 K/UL
NEUTROPHILS NFR BLD AUTO: 52.9 %
PLATELET # BLD AUTO: 211 K/UL
POTASSIUM SERPL-SCNC: 4.7 MMOL/L
PROT SERPL-MCNC: 7.5 G/DL
RBC # BLD: 3.97 M/UL
RBC # FLD: 15.2 %
SODIUM SERPL-SCNC: 138 MMOL/L
TRIGL SERPL-MCNC: 157 MG/DL
WBC # FLD AUTO: 5.15 K/UL

## 2020-07-02 RX ORDER — LANCETS
EACH MISCELLANEOUS
Qty: 1 | Refills: 3 | Status: DISCONTINUED | COMMUNITY
Start: 2019-07-25 | End: 2020-07-02

## 2020-08-05 ENCOUNTER — RX RENEWAL (OUTPATIENT)
Age: 65
End: 2020-08-05

## 2020-08-06 ENCOUNTER — RX RENEWAL (OUTPATIENT)
Age: 65
End: 2020-08-06

## 2020-09-17 ENCOUNTER — APPOINTMENT (OUTPATIENT)
Dept: INTERNAL MEDICINE | Facility: CLINIC | Age: 65
End: 2020-09-17
Payer: MEDICARE

## 2020-09-17 VITALS
SYSTOLIC BLOOD PRESSURE: 120 MMHG | WEIGHT: 200.6 LBS | HEIGHT: 60 IN | BODY MASS INDEX: 39.38 KG/M2 | TEMPERATURE: 97.3 F | DIASTOLIC BLOOD PRESSURE: 70 MMHG

## 2020-09-17 PROCEDURE — 90662 IIV NO PRSV INCREASED AG IM: CPT

## 2020-09-17 PROCEDURE — 99215 OFFICE O/P EST HI 40 MIN: CPT | Mod: 25

## 2020-09-17 PROCEDURE — 36415 COLL VENOUS BLD VENIPUNCTURE: CPT

## 2020-09-17 PROCEDURE — G0008: CPT

## 2020-09-17 NOTE — PLAN
[FreeTextEntry1] :  PT HERE FOR FOLLOW UP HX OF  CVA CLL ALSO WITH CHRONIC PAIN   DJD ARTHRITIS ON VICODIN FOR YEARS IS NOT ABUSING THEM  SEE RHEUMATOLOGIST BUT NOW WAS UPSET AND MAY BE LOOKING FOR NEW ONE\par NEEDS RENEWAL TODAY\par  LAST TIME A1C ELEVATED \par   HAD BEEN \par OFF METFORMIN FOR A WHILE BUT LAST TIME WITH LEVATED A1C 8.8 AND METFORMIN RESTATED\par  \par  WILL CHECK LABS \par PT HAS SEEN  PAIN MANAGEMENT IN THE PAST DOES NOT ABUSE THE PAIN MEDS\par UNABLE  TO TAPER HAS BEEN ON FOR YEARS PRIOR TO ME PRESCRIBING THEM\par AND DOESN'T ASK FOR EXTRA \par LONG D/W PT ABOIUT DM \par PT ALSO TO SEE ORTHO DR PINTO\par HIGH DOSE FLU SHOT GIVEN

## 2020-09-17 NOTE — HISTORY OF PRESENT ILLNESS
[FreeTextEntry1] : follow up on bp and chronic pain [de-identified] : PT HERE FOR FOLLOW UP FEELS OK  NO CP HAS SEEN [facility] ONCOLOGIST \par HAS CHRONIC PAIN HAS RECENT INJECTION IN KNEE BUT HAS BECOME UPSET WITH RHEUMATOLOGIST HE HAS BEEN SEING FOR YEARS AND NO LONGER GOING BACK TO SEE HIM\par  OVERALL FEELS WELL HAS NOT HAD COVID SX AND FEELING OK

## 2020-09-23 LAB
CHOLEST SERPL-MCNC: 121 MG/DL
CHOLEST/HDLC SERPL: 4 RATIO
ESTIMATED AVERAGE GLUCOSE: 128 MG/DL
HBA1C MFR BLD HPLC: 6.1 %
HDLC SERPL-MCNC: 30 MG/DL
LDLC SERPL CALC-MCNC: 72 MG/DL
PSA SERPL-MCNC: 0.59 NG/ML
TRIGL SERPL-MCNC: 94 MG/DL

## 2020-12-21 PROBLEM — J06.9 ACUTE URI: Status: RESOLVED | Noted: 2019-10-23 | Resolved: 2020-12-21

## 2020-12-31 ENCOUNTER — APPOINTMENT (OUTPATIENT)
Dept: INTERNAL MEDICINE | Facility: CLINIC | Age: 65
End: 2020-12-31
Payer: MEDICARE

## 2020-12-31 VITALS
HEIGHT: 72 IN | DIASTOLIC BLOOD PRESSURE: 70 MMHG | BODY MASS INDEX: 27.09 KG/M2 | WEIGHT: 200 LBS | TEMPERATURE: 96.5 F | SYSTOLIC BLOOD PRESSURE: 130 MMHG

## 2020-12-31 PROCEDURE — 99214 OFFICE O/P EST MOD 30 MIN: CPT | Mod: 25

## 2020-12-31 PROCEDURE — 36415 COLL VENOUS BLD VENIPUNCTURE: CPT

## 2020-12-31 NOTE — PLAN
[FreeTextEntry1] :  PT HERE FOR FOLLOW UP HX OF  CVA CLL ALSO WITH CHRONIC PAIN   DJD ARTHRITIS ON VICODIN FOR YEARS IS NOT ABUSING THEM  SEE RHEUMATOLOGIST BUT NOW WAS UPSET AND MAY BE LOOKING FOR NEW ONE\par N Y\par  LAST TIME A1C ELEVATED IMPROVE DON METFROMIN BID   \par     \par PT HAS SEEN  PAIN MANAGEMENT IN THE PAST DOES NOT ABUSE THE PAIN MEDS\par UNABLE  TO TAPER HAS BEEN ON FOR YEARS PRIOR TO ME PRESCRIBING THEM\par AND DOESN'T ASK FOR EXTRA \par LONG D/W PT ABOIUT DM \par PT ALSO TO SEE ORTHO DR PINTO\par OVERALL STABLE

## 2020-12-31 NOTE — HISTORY OF PRESENT ILLNESS
[FreeTextEntry1] : follow up on bp and chronic pain AND DM  [de-identified] : PT HERE FOR FOLLOW UP FEELS OK  NO CP HAS SEEN [facility]COLOG[facility] \par HAS CHRONIC PAIN HAS RECENT INJECTION IN KNEE BUT HAS BECOME UPSET WITH RHEUMATOLOGIST HE HAS BEEN SEING FOR YEARS AND NO LONGER GOING BACK TO SEE HIM\par  OVERALL FEELS WELL HAS NOT HAD COVID SX AND FEELING OK \par HAS  BACK ON METFORMIN TWICE A DAY BRINGS IN HIS LOG

## 2021-01-04 LAB
ESTIMATED AVERAGE GLUCOSE: 146 MG/DL
HBA1C MFR BLD HPLC: 6.7 %

## 2021-01-06 ENCOUNTER — NON-APPOINTMENT (OUTPATIENT)
Age: 66
End: 2021-01-06

## 2021-03-11 ENCOUNTER — APPOINTMENT (OUTPATIENT)
Dept: INTERNAL MEDICINE | Facility: CLINIC | Age: 66
End: 2021-03-11
Payer: MEDICARE

## 2021-03-11 VITALS
DIASTOLIC BLOOD PRESSURE: 76 MMHG | HEIGHT: 72 IN | BODY MASS INDEX: 27.63 KG/M2 | WEIGHT: 204 LBS | SYSTOLIC BLOOD PRESSURE: 147 MMHG | TEMPERATURE: 97 F

## 2021-03-11 DIAGNOSIS — M19.90 UNSPECIFIED OSTEOARTHRITIS, UNSPECIFIED SITE: ICD-10-CM

## 2021-03-11 DIAGNOSIS — J04.10 ACUTE TRACHEITIS W/OUT OBSTRUCTION: ICD-10-CM

## 2021-03-11 DIAGNOSIS — Z85.6 PERSONAL HISTORY OF LEUKEMIA: ICD-10-CM

## 2021-03-11 PROCEDURE — 99215 OFFICE O/P EST HI 40 MIN: CPT

## 2021-03-11 NOTE — PLAN
[FreeTextEntry1] :  PT HERE FOR FOLLOW UP HX OF  CVA CLL ALSO WITH CHRONIC PAIN   DJD ARTHRITIS ON VICODIN FOR YEARS IS NOT ABUSING THEM  SAW  RHEUMATOLOGIST BUT NOW WAS UPSETNOT GOING BACK THERE \par N Y\par  LAST TIME A1C ELEVATED IMPROVE DON METFORMIN BID   \par     \par PT HAS SEEN  PAIN MANAGEMENT IN THE PAST DOES NOT ABUSE THE PAIN MEDS\par UNABLE  TO TAPER HAS BEEN ON FOR YEARS PRIOR TO ME PRESCRIBING THEM \par AND DOESN'T ASK FOR EXTRA DOSES \par LONG D/W PT ABOIUT DM \par  REVIEWD CT SCANS WITH HIM HE HAS LN AND ONCOLOGY IS WATCHING THEM\par ESOPHOGRAM WITH ? NARROWING SUGGEST HE GET AND EGD ADN REFERRED TO GI FOR EVAL\par HE ALSO IS CONCERNED AOBUT TRACHEA ISSUES WILL REFER TO ENT\par REVIEWED LABS  WITH PT

## 2021-03-11 NOTE — HISTORY OF PRESENT ILLNESS
[FreeTextEntry1] : follow up on bp and chronic pain AND DM  [de-identified] : PT HERE FOR FOLLOW UP FEELS OK  NO CP HAS SEEN [facility]COLOG[facility] \par HAS CHRONIC PAIN  HAD BECOME UPSET WITH RHEUMATOLOGIST HE HAS BEEN SEENG FOR YEARS AND NO LONGER GOING BACK TO SEE HIM\par  OVERALL FEELS WELL HAS NOT HAD COVID SX AND FEELING OK \par HAS BEEN BACK ON METFORMIN TWICE A DAY BRINGS IN HIS   LOG\par AND HAD RECETN CT SCAN S FORM ONCOLOGIST AND ESOPHAGRAM WHICH HE BRINGS IN

## 2021-05-23 ENCOUNTER — RX RENEWAL (OUTPATIENT)
Age: 66
End: 2021-05-23

## 2021-06-17 ENCOUNTER — APPOINTMENT (OUTPATIENT)
Dept: INTERNAL MEDICINE | Facility: CLINIC | Age: 66
End: 2021-06-17
Payer: MEDICARE

## 2021-06-17 VITALS
BODY MASS INDEX: 27.5 KG/M2 | SYSTOLIC BLOOD PRESSURE: 130 MMHG | HEIGHT: 72 IN | DIASTOLIC BLOOD PRESSURE: 80 MMHG | WEIGHT: 203 LBS | TEMPERATURE: 97 F

## 2021-06-17 PROCEDURE — 36415 COLL VENOUS BLD VENIPUNCTURE: CPT

## 2021-06-17 PROCEDURE — 99215 OFFICE O/P EST HI 40 MIN: CPT | Mod: 25

## 2021-06-17 NOTE — HISTORY OF PRESENT ILLNESS
[FreeTextEntry1] : follow up on bp and chronic pain AND DM  [de-identified] : PT HERE FOR FOLLOW UP FEELS OK  NO CP HAS SEEN [facility] \par HAS CHRONIC PAIN  HAD BECOME UPSET WITH RHEUMATOLOGIST HE HAS BEEN SEENG FOR YEARS AND NO LONGER GOING BACK TO SEE HIM\par  OVERALL FEELS WELL HAS NOT HAD COVID SX AND FEELING OK  HAS BEEN VACCINATED \par HAS BEEN BACK ON METFORMIN TWICE A DAY BRINGS IN HIS   LOG\par AND HAD RECETN  ISSUE WITH BLEEDING AND HAS APPT FOR EGD AND COLONOSCOPY COMING UP\par \par \par \par \par \par \par \par \par \par \par \par \par \par

## 2021-06-17 NOTE — PLAN
[FreeTextEntry1] :  PT HERE FOR FOLLOW UP HX OF  CVA CLL ALSO WITH CHRONIC PAIN   DJD ARTHRITIS ON VICODIN FOR YEARS IS NOT ABUSING THEM  SAW  RHEUMATOLOGIST BUT NOW WAS UPSETNOT GOING BACK THERE \par N Y\par  LAST TIME A1C ELEVATED IMPROVE DON METFORMIN BID   \par     \par PT HAS SEEN  PAIN MANAGEMENT IN THE PAST DOES NOT ABUSE THE PAIN MEDS\par UNABLE  TO TAPER HAS BEEN ON FOR YEARS PRIOR TO ME PRESCRIBING THEM \par AND DOESN'T ASK FOR EXTRA DOSES \par LONG D/W PT ABOIUT DM \par  REVIEWD  BLOOD WORK AND HE NEEDS EGD AND COLONSOCPY TO LOOK FOR SOURCE OF ANEMIA\par  \par REVIEWED LABS  WITH PT

## 2021-06-18 LAB
BASOPHILS # BLD AUTO: 0.02 K/UL
BASOPHILS NFR BLD AUTO: 0.6 %
CHOLEST SERPL-MCNC: 112 MG/DL
EOSINOPHIL # BLD AUTO: 0.07 K/UL
EOSINOPHIL NFR BLD AUTO: 2 %
ESTIMATED AVERAGE GLUCOSE: 143 MG/DL
HBA1C MFR BLD HPLC: 6.6 %
HCT VFR BLD CALC: 35.1 %
HDLC SERPL-MCNC: 23 MG/DL
HGB BLD-MCNC: 10.9 G/DL
IMM GRANULOCYTES NFR BLD AUTO: 0.8 %
LDLC SERPL CALC-MCNC: 53 MG/DL
LYMPHOCYTES # BLD AUTO: 1.08 K/UL
LYMPHOCYTES NFR BLD AUTO: 30.5 %
MAN DIFF?: NORMAL
MCHC RBC-ENTMCNC: 27.2 PG
MCHC RBC-ENTMCNC: 31.1 GM/DL
MCV RBC AUTO: 87.5 FL
MONOCYTES # BLD AUTO: 0.34 K/UL
MONOCYTES NFR BLD AUTO: 9.6 %
NEUTROPHILS # BLD AUTO: 2 K/UL
NEUTROPHILS NFR BLD AUTO: 56.5 %
NONHDLC SERPL-MCNC: 89 MG/DL
PLATELET # BLD AUTO: 174 K/UL
RBC # BLD: 4.01 M/UL
RBC # FLD: 17.6 %
TRIGL SERPL-MCNC: 179 MG/DL
WBC # FLD AUTO: 3.54 K/UL

## 2021-08-11 ENCOUNTER — NON-APPOINTMENT (OUTPATIENT)
Age: 66
End: 2021-08-11

## 2021-09-23 ENCOUNTER — APPOINTMENT (OUTPATIENT)
Dept: GASTROENTEROLOGY | Facility: CLINIC | Age: 66
End: 2021-09-23
Payer: MEDICARE

## 2021-09-23 ENCOUNTER — APPOINTMENT (OUTPATIENT)
Dept: INTERNAL MEDICINE | Facility: CLINIC | Age: 66
End: 2021-09-23
Payer: MEDICARE

## 2021-09-23 VITALS
OXYGEN SATURATION: 98 % | SYSTOLIC BLOOD PRESSURE: 138 MMHG | RESPIRATION RATE: 14 BRPM | WEIGHT: 198 LBS | DIASTOLIC BLOOD PRESSURE: 73 MMHG | TEMPERATURE: 97.7 F | BODY MASS INDEX: 26.82 KG/M2 | HEIGHT: 72 IN | HEART RATE: 62 BPM

## 2021-09-23 VITALS
TEMPERATURE: 97.3 F | HEIGHT: 72 IN | WEIGHT: 198 LBS | SYSTOLIC BLOOD PRESSURE: 110 MMHG | DIASTOLIC BLOOD PRESSURE: 70 MMHG | BODY MASS INDEX: 26.82 KG/M2

## 2021-09-23 PROCEDURE — 99205 OFFICE O/P NEW HI 60 MIN: CPT

## 2021-09-23 PROCEDURE — 90662 IIV NO PRSV INCREASED AG IM: CPT

## 2021-09-23 PROCEDURE — 36415 COLL VENOUS BLD VENIPUNCTURE: CPT

## 2021-09-23 PROCEDURE — G0008: CPT

## 2021-09-23 PROCEDURE — 99215 OFFICE O/P EST HI 40 MIN: CPT | Mod: 25

## 2021-09-23 RX ORDER — CEPHALEXIN 500 MG/1
500 TABLET ORAL 3 TIMES DAILY
Qty: 21 | Refills: 0 | Status: DISCONTINUED | COMMUNITY
Start: 2019-10-23 | End: 2021-09-23

## 2021-09-23 RX ORDER — AZITHROMYCIN 250 MG/1
250 TABLET, FILM COATED ORAL
Qty: 6 | Refills: 0 | Status: DISCONTINUED | COMMUNITY
End: 2021-09-23

## 2021-09-23 RX ORDER — CEFADROXIL 500 MG/1
500 CAPSULE ORAL
Qty: 28 | Refills: 1 | Status: DISCONTINUED | COMMUNITY
Start: 2019-04-02 | End: 2021-09-23

## 2021-09-23 RX ORDER — POLYETHYLENE GLYCOL-3350 AND ELECTROLYTES WITH FLAVOR PACK 240; 5.84; 2.98; 6.72; 22.72 G/278.26G; G/278.26G; G/278.26G; G/278.26G; G/278.26G
240 POWDER, FOR SOLUTION ORAL
Qty: 1 | Refills: 0 | Status: ACTIVE | COMMUNITY
Start: 2021-09-23 | End: 1900-01-01

## 2021-09-23 RX ORDER — SODIUM PICOSULFATE, MAGNESIUM OXIDE, AND ANHYDROUS CITRIC ACID 10; 3.5; 12 MG/160ML; G/160ML; G/160ML
10-3.5-12 MG-GM LIQUID ORAL
Qty: 1 | Refills: 0 | Status: DISCONTINUED | COMMUNITY
Start: 2021-09-23 | End: 2021-09-23

## 2021-09-23 RX ORDER — PANTOPRAZOLE SODIUM 40 MG/10ML
40 INJECTION, POWDER, FOR SOLUTION INTRAVENOUS
Refills: 0 | Status: DISCONTINUED | COMMUNITY

## 2021-09-23 NOTE — ASSESSMENT
[FreeTextEntry1] : After reviewing all his outside medical records and speaking to them and evaluating the patient, I recommended that he have an evaluation for anemia including endoscopy and colonoscopy.  Although his anemia could be related to her lymphoma be important to rule out a GI source.  He did have episodic dysphagia earlier this year.  He will get repeat blood work and the indications benefits risks alternatives to the procedures were discussed with the patient and he is medically optimal for the planned procedure

## 2021-09-23 NOTE — PHYSICAL EXAM
[Well Nourished] : well nourished [No Acute Distress] : no acute distress [Well Developed] : well developed [Well-Appearing] : well-appearing [Normal Sclera/Conjunctiva] : normal sclera/conjunctiva [PERRL] : pupils equal round and reactive to light [EOMI] : extraocular movements intact [Normal Outer Ear/Nose] : the outer ears and nose were normal in appearance [Normal Oropharynx] : the oropharynx was normal [No JVD] : no jugular venous distention [No Lymphadenopathy] : no lymphadenopathy [Supple] : supple [Thyroid Normal, No Nodules] : the thyroid was normal and there were no nodules present [No Respiratory Distress] : no respiratory distress  [No Accessory Muscle Use] : no accessory muscle use [Clear to Auscultation] : lungs were clear to auscultation bilaterally [Normal Rate] : normal rate  [Regular Rhythm] : with a regular rhythm [Normal S1, S2] : normal S1 and S2 [No Murmur] : no murmur heard [No Carotid Bruits] : no carotid bruits [No Abdominal Bruit] : a ~M bruit was not heard ~T in the abdomen [No Varicosities] : no varicosities [Pedal Pulses Present] : the pedal pulses are present [No Edema] : there was no peripheral edema [No Palpable Aorta] : no palpable aorta [No Extremity Clubbing/Cyanosis] : no extremity clubbing/cyanosis [Soft] : abdomen soft [Non Tender] : non-tender [Non-distended] : non-distended [No Masses] : no abdominal mass palpated [No HSM] : no HSM [Normal Posterior Cervical Nodes] : no posterior cervical lymphadenopathy [Normal Bowel Sounds] : normal bowel sounds [Normal Anterior Cervical Nodes] : no anterior cervical lymphadenopathy [No CVA Tenderness] : no CVA  tenderness [No Spinal Tenderness] : no spinal tenderness [No Joint Swelling] : no joint swelling [Grossly Normal Strength/Tone] : grossly normal strength/tone [No Rash] : no rash [Coordination Grossly Intact] : coordination grossly intact [No Focal Deficits] : no focal deficits [Normal Gait] : normal gait [Deep Tendon Reflexes (DTR)] : deep tendon reflexes were 2+ and symmetric [Normal Insight/Judgement] : insight and judgment were intact [Normal Affect] : the affect was normal

## 2021-09-23 NOTE — HISTORY OF PRESENT ILLNESS
[FreeTextEntry1] : follow up on bp and chronic pain AND DM  [de-identified] : PT HERE FOR FOLLOW UP FEELS OK  NO CP HAS SEEN [facility] ON[facility] \par HAS CHRONIC PAIN  HAD BECOME UPSET WITH RHEUMATOLOGIST HE HAS BEEN SEENG FOR YEARS AND NO LONGER GOING BACK TO SEE HIM\par   HAS BEEN VACCINATED \par HAS BEEN BACK ON METFORMIN TWICE A DAY BRINGS IN HIS   LOGS\par AND HAD RECETN  ISSUE WITH BLEEDING AND HAS APPT FOR EGD AND COLONOSCOPY COMING UP SAW DR BRYAN THIS AM\par \par \par \par \par \par \par \par \par \par \par \par \par \par

## 2021-09-23 NOTE — PLAN
[FreeTextEntry1] :  PT HERE FOR FOLLOW UP HX OF  CVA CLL ALSO WITH CHRONIC PAIN   DJD ARTHRITIS ON VICODIN FOR YEARS IS NOT ABUSING THEM  SAW  RHEUMATOLOGIST BUT NOW WAS UPSETNOT GOING BACK THERE \par N Y\par  LAST TIME A1C ELEVATED IMPROVE DON METFORMIN BID   \par     AND FSG HERE ARE GOOD WILL RECOMMEND DECREASE FSG TO ONCE OR TWICE A WEEK \par PT HAS SEEN  PAIN MANAGEMENT IN THE PAST DOES NOT ABUSE THE PAIN MEDS HAD ABOUT 20 LEFT OVER \par UNABLE  TO TAPER HAS BEEN ON FOR YEARS PRIOR TO ME PRESCRIBING THEM \par AND DOESN'T ASK FOR EXTRA DOSES \par LONG D/W PT ABOIUT DM \par  REVIEWD  BLOOD WORK AND HE NEEDS EGD AND COLONSOCPY TO LOOK FOR SOURCE OF ANEMIA HAS SEEN GI AND HAS IT SCHDYULED \par FOLLO WUP WITH ONC\par FLU SHOT GIVEN\par  \par REVIEWED LABS  WITH PT

## 2021-09-23 NOTE — PHYSICAL EXAM
[General Appearance - Alert] : alert [General Appearance - In No Acute Distress] : in no acute distress [FreeTextEntry1] : Adenopathy [Auscultation Breath Sounds / Voice Sounds] : lungs were clear to auscultation bilaterally [Heart Rate And Rhythm] : heart rate was normal and rhythm regular [Heart Sounds] : normal S1 and S2 [Heart Sounds Gallop] : no gallops [Murmurs] : no murmurs [Heart Sounds Pericardial Friction Rub] : no pericardial rub [Bowel Sounds] : normal bowel sounds [Abdomen Soft] : soft [Abdomen Tenderness] : non-tender [] : no hepato-splenomegaly [Abdomen Mass (___ Cm)] : no abdominal mass palpated [Oriented To Time, Place, And Person] : oriented to person, place, and time [Impaired Insight] : insight and judgment were intact [Affect] : the affect was normal

## 2021-09-25 ENCOUNTER — NON-APPOINTMENT (OUTPATIENT)
Age: 66
End: 2021-09-25

## 2021-09-26 LAB
ALBUMIN SERPL ELPH-MCNC: 4.9 G/DL
ALP BLD-CCNC: 60 U/L
ALT SERPL-CCNC: 30 U/L
ANION GAP SERPL CALC-SCNC: 14 MMOL/L
AST SERPL-CCNC: 37 U/L
BASOPHILS # BLD AUTO: 0.03 K/UL
BASOPHILS NFR BLD AUTO: 0.9 %
BILIRUB SERPL-MCNC: 0.3 MG/DL
BUN SERPL-MCNC: 26 MG/DL
CALCIUM SERPL-MCNC: 9.7 MG/DL
CHLORIDE SERPL-SCNC: 108 MMOL/L
CO2 SERPL-SCNC: 20 MMOL/L
CREAT SERPL-MCNC: 0.93 MG/DL
EOSINOPHIL # BLD AUTO: 0.1 K/UL
EOSINOPHIL NFR BLD AUTO: 2.8 %
GLUCOSE SERPL-MCNC: 182 MG/DL
HCT VFR BLD CALC: 34.1 %
HGB BLD-MCNC: 11.3 G/DL
IMM GRANULOCYTES NFR BLD AUTO: 0.9 %
LYMPHOCYTES # BLD AUTO: 1.33 K/UL
LYMPHOCYTES NFR BLD AUTO: 37.9 %
MAN DIFF?: NORMAL
MCHC RBC-ENTMCNC: 30.5 PG
MCHC RBC-ENTMCNC: 33.1 GM/DL
MCV RBC AUTO: 92.2 FL
MONOCYTES # BLD AUTO: 0.25 K/UL
MONOCYTES NFR BLD AUTO: 7.1 %
NEUTROPHILS # BLD AUTO: 1.77 K/UL
NEUTROPHILS NFR BLD AUTO: 50.4 %
PLATELET # BLD AUTO: 152 K/UL
POTASSIUM SERPL-SCNC: 4.5 MMOL/L
PROT SERPL-MCNC: 6.8 G/DL
RBC # BLD: 3.7 M/UL
RBC # FLD: 13.5 %
SODIUM SERPL-SCNC: 142 MMOL/L
WBC # FLD AUTO: 3.51 K/UL

## 2021-10-06 LAB
CHOLEST SERPL-MCNC: 123 MG/DL
ESTIMATED AVERAGE GLUCOSE: 126 MG/DL
HBA1C MFR BLD HPLC: 6 %
HDLC SERPL-MCNC: 24 MG/DL
LDLC SERPL CALC-MCNC: 64 MG/DL
NONHDLC SERPL-MCNC: 99 MG/DL
PSA SERPL-MCNC: 0.17 NG/ML
TRIGL SERPL-MCNC: 172 MG/DL

## 2021-10-07 ENCOUNTER — NON-APPOINTMENT (OUTPATIENT)
Age: 66
End: 2021-10-07

## 2021-11-05 DIAGNOSIS — Z01.818 ENCOUNTER FOR OTHER PREPROCEDURAL EXAMINATION: ICD-10-CM

## 2021-11-06 ENCOUNTER — APPOINTMENT (OUTPATIENT)
Dept: DISASTER EMERGENCY | Facility: CLINIC | Age: 66
End: 2021-11-06

## 2021-11-07 LAB — SARS-COV-2 N GENE NPH QL NAA+PROBE: NOT DETECTED

## 2021-11-08 ENCOUNTER — TRANSCRIPTION ENCOUNTER (OUTPATIENT)
Age: 66
End: 2021-11-08

## 2021-11-09 ENCOUNTER — APPOINTMENT (OUTPATIENT)
Dept: GASTROENTEROLOGY | Facility: GI CENTER | Age: 66
End: 2021-11-09
Payer: MEDICARE

## 2021-11-09 ENCOUNTER — OUTPATIENT (OUTPATIENT)
Dept: OUTPATIENT SERVICES | Facility: HOSPITAL | Age: 66
LOS: 1 days | End: 2021-11-09
Payer: MEDICARE

## 2021-11-09 ENCOUNTER — RESULT REVIEW (OUTPATIENT)
Age: 66
End: 2021-11-09

## 2021-11-09 DIAGNOSIS — D64.9 ANEMIA, UNSPECIFIED: ICD-10-CM

## 2021-11-09 LAB — GLUCOSE BLDC GLUCOMTR-MCNC: 195 MG/DL — HIGH (ref 70–99)

## 2021-11-09 PROCEDURE — 45378 DIAGNOSTIC COLONOSCOPY: CPT

## 2021-11-09 PROCEDURE — 88305 TISSUE EXAM BY PATHOLOGIST: CPT | Mod: 26

## 2021-11-09 PROCEDURE — 82962 GLUCOSE BLOOD TEST: CPT

## 2021-11-09 PROCEDURE — 43239 EGD BIOPSY SINGLE/MULTIPLE: CPT

## 2021-11-09 PROCEDURE — 88305 TISSUE EXAM BY PATHOLOGIST: CPT

## 2021-11-11 LAB — SURGICAL PATHOLOGY STUDY: SIGNIFICANT CHANGE UP

## 2021-12-11 ENCOUNTER — RX RENEWAL (OUTPATIENT)
Age: 66
End: 2021-12-11

## 2021-12-16 ENCOUNTER — APPOINTMENT (OUTPATIENT)
Dept: INTERNAL MEDICINE | Facility: CLINIC | Age: 66
End: 2021-12-16
Payer: MEDICARE

## 2021-12-16 VITALS
HEIGHT: 72 IN | BODY MASS INDEX: 26.66 KG/M2 | SYSTOLIC BLOOD PRESSURE: 120 MMHG | WEIGHT: 196.8 LBS | DIASTOLIC BLOOD PRESSURE: 70 MMHG

## 2021-12-16 PROCEDURE — 99215 OFFICE O/P EST HI 40 MIN: CPT

## 2021-12-16 RX ORDER — OMEPRAZOLE 40 MG/1
40 CAPSULE, DELAYED RELEASE ORAL
Qty: 90 | Refills: 0 | Status: ACTIVE | COMMUNITY
Start: 1900-01-01 | End: 1900-01-01

## 2021-12-16 NOTE — PLAN
[FreeTextEntry1] : 66 PT HERE FOR FOLLOW UP FEELS OK  NO CP HAS SEEN [facility] \par HAS CHRONIC PAIN  HAD BECOME UPSET WITH RHEUMATOLOGIST HE HAS BEEN SEENG FOR YEARS AND NO LONGER GOING BACK TO SEE HIM\par   HAS BEEN VACCINATED \par HAS BEEN BACK ON METFORMIN TWICE A DAY BRINGS IN HIS   LOGS\par AND HAD RECETN  ISSUE WITH BLEEDING AND HAS APPT FOR EGD AND COLONOSCOPY COMING UP SA[facility] DR BRYAN  AND HAD PROCEDURES NO OBVIOUS SOURCE OF BLEEDING\par AND \par I SPLANNING ON HAVING CAPSULE ENDOCSCOPY \par HAS RECEIVED SOME IRON TRANSFUSIONS\par WILL DEFER LABS AS HE HAD LABS RECENTLY WILL DO NEXT TIME\par NEEDED NOTE FOR HOUSING\par WILL FOLLOW UP \par \par \par \par \par \par \par \par

## 2021-12-16 NOTE — HISTORY OF PRESENT ILLNESS
[FreeTextEntry1] : follow up on bp and chronic pain AND DM  [de-identified] : 66 PT HERE FOR FOLLOW UP FEELS OK  NO CP HAS SEEN [facility] ON[facility] \par HAS CHRONIC PAIN  HAD BECOME UPSET WITH RHEUMATOLOGIST HE HAS BEEN SEENG FOR YEARS AND NO LONGER GOING BACK TO SEE HIM\par   HAS BEEN VACCINATED \par HAS BEEN BACK ON METFORMIN TWICE A DAY BRINGS IN HIS   LOGS\par AND HAD RECETN  ISSUE WITH BLEEDING AND HAS APPT FOR EGD AND COLONOSCOPY COMING UP [facility] DR BRYAN  AND HAD PROCEDURES NO OBVIOUS SOURCE OF BLEEDING\par AND \par I SPLANNING ON HAVING CAPSULE ENDOCSCOPY \par HAS RECEIVED SOME IRON TRANSFUSIONS\par \par \par \par \par \par \par \par \par \par \par \par \par

## 2022-01-31 ENCOUNTER — NON-APPOINTMENT (OUTPATIENT)
Age: 67
End: 2022-01-31

## 2022-03-16 ENCOUNTER — APPOINTMENT (OUTPATIENT)
Dept: INTERNAL MEDICINE | Facility: CLINIC | Age: 67
End: 2022-03-16
Payer: MEDICARE

## 2022-03-16 VITALS
BODY MASS INDEX: 26.55 KG/M2 | HEIGHT: 72 IN | DIASTOLIC BLOOD PRESSURE: 80 MMHG | WEIGHT: 196 LBS | OXYGEN SATURATION: 98 % | HEART RATE: 81 BPM | SYSTOLIC BLOOD PRESSURE: 120 MMHG

## 2022-03-16 PROCEDURE — 99215 OFFICE O/P EST HI 40 MIN: CPT

## 2022-03-16 NOTE — HISTORY OF PRESENT ILLNESS
[FreeTextEntry1] : HOSPITAL FOLLOWUP WITH COVID  [de-identified] : 66 PT HERE FOR FOLLOW UP  WSA HOSPITALISED AT [facility] WITH COVID  AND [place] AND WAS THERE THROUGH 1/27 AND THEN WENT TO REHABE TILL END OF FEBRUATRY NOW HERE FOR OFLLOWUP FEELS OK   USING WALKER AND BACK ON PREVIOS MED REGIMEN FSG OK   \par HAS BEEN BACK ON METFORMIN TWICE A DAY BRINGS IN HIS   LOGS\par  OVERALL IMPROVED BUT RPROTS HE WAS CLOSE TO DEATH IN HOSP

## 2022-03-16 NOTE — PLAN
[FreeTextEntry1] : 66 PT HERE FOR FOLLOW UP  WSA HOSPITALISED AT [facility] WITH COVID  AND HELEN AND WAS THERE THROUGH 1/27 AND THEN WENT TO REHABE TILL END OF FEBRUATRY NOW HERE FOR OFLLOWUP FEELS OK   USING WALKER AND BACK ON PREVIOS MED REGIMEN FSG OK   \par HAS BEEN BACK ON METFORMIN TWICE A DAY BRINGS IN HIS   LOGS\par  OVERALL IMPROVED BUT RPROTS HE WAS CLOSE TO DEATH IN HOSP\par REVIEWED SOME [facility] RECORDS\par WILL DEFER LABS AS HAD BEEN ON STEROIDS  IN THE HOSPITAL RENEWED CHRONIC PAIN MEDS\par USED FOR BACK PAIN HE HAS NOT ASKED FOR THEM SOONER AS HE WAS IN THE HOSPITAL

## 2022-05-16 ENCOUNTER — APPOINTMENT (OUTPATIENT)
Dept: RADIOLOGY | Facility: CLINIC | Age: 67
End: 2022-05-16
Payer: MEDICARE

## 2022-05-16 ENCOUNTER — OUTPATIENT (OUTPATIENT)
Dept: OUTPATIENT SERVICES | Facility: HOSPITAL | Age: 67
LOS: 1 days | End: 2022-05-16
Payer: MEDICARE

## 2022-05-16 ENCOUNTER — RESULT REVIEW (OUTPATIENT)
Age: 67
End: 2022-05-16

## 2022-05-16 ENCOUNTER — APPOINTMENT (OUTPATIENT)
Dept: INTERNAL MEDICINE | Facility: CLINIC | Age: 67
End: 2022-05-16
Payer: MEDICARE

## 2022-05-16 VITALS
WEIGHT: 184 LBS | OXYGEN SATURATION: 99 % | HEART RATE: 79 BPM | HEIGHT: 72 IN | DIASTOLIC BLOOD PRESSURE: 80 MMHG | SYSTOLIC BLOOD PRESSURE: 130 MMHG | BODY MASS INDEX: 24.92 KG/M2

## 2022-05-16 DIAGNOSIS — M54.9 DORSALGIA, UNSPECIFIED: ICD-10-CM

## 2022-05-16 PROCEDURE — 99215 OFFICE O/P EST HI 40 MIN: CPT | Mod: 25

## 2022-05-16 PROCEDURE — 72100 X-RAY EXAM L-S SPINE 2/3 VWS: CPT | Mod: 26

## 2022-05-16 PROCEDURE — 36415 COLL VENOUS BLD VENIPUNCTURE: CPT

## 2022-05-16 PROCEDURE — 72100 X-RAY EXAM L-S SPINE 2/3 VWS: CPT

## 2022-05-16 RX ORDER — PANTOPRAZOLE 40 MG/1
40 TABLET, DELAYED RELEASE ORAL
Qty: 30 | Refills: 0 | Status: ACTIVE | COMMUNITY
Start: 2021-10-13

## 2022-05-16 RX ORDER — INSULIN LISPRO 100 U/ML
100 INJECTION, SOLUTION INTRAVENOUS; SUBCUTANEOUS
Qty: 3 | Refills: 0 | Status: ACTIVE | COMMUNITY
Start: 2022-01-27

## 2022-05-16 RX ORDER — FLUTICASONE FUROATE AND VILANTEROL TRIFENATATE 100; 25 UG/1; UG/1
100-25 POWDER RESPIRATORY (INHALATION)
Qty: 28 | Refills: 0 | Status: ACTIVE | COMMUNITY
Start: 2022-01-27

## 2022-05-16 RX ORDER — LATANOPROST/PF 0.005 %
0.01 DROPS OPHTHALMIC (EYE)
Qty: 2 | Refills: 0 | Status: ACTIVE | COMMUNITY
Start: 2022-01-27

## 2022-05-16 RX ORDER — LOPERAMIDE HYDROCHLORIDE 2 MG/1
2 CAPSULE ORAL
Qty: 9 | Refills: 0 | Status: ACTIVE | COMMUNITY
Start: 2022-02-02

## 2022-05-16 RX ORDER — DEXAMETHASONE 6 MG/1
6 TABLET ORAL
Qty: 5 | Refills: 0 | Status: ACTIVE | COMMUNITY
Start: 2022-01-27

## 2022-05-16 RX ORDER — TIMOLOL MALEATE 5 MG/ML
0.5 SOLUTION OPHTHALMIC
Qty: 5 | Refills: 0 | Status: ACTIVE | COMMUNITY
Start: 2022-01-27

## 2022-05-16 RX ORDER — IPRATROPIUM BROMIDE 21 UG/1
0.03 SPRAY NASAL
Qty: 30 | Refills: 0 | Status: ACTIVE | COMMUNITY
Start: 2022-04-07

## 2022-05-16 NOTE — PLAN
[FreeTextEntry1] : 66 PT HERE FOR FOLLOW UP  WSA HOSPITALISED AT [facility] WITH COVID  AND HELEN AND WAS THERE THROUGH 1/27 AND THEN WENT TO REHABE TILL END OF FEBRUATRY SEEN HERE IN [place] NOW HERE  FOR FOLLOWUP FEELS BETTER HAS SOME BACK PAIN \par HAS BEEN BACK ON METFORMIN TWICE A DAY BRINGS IN HIS   LOGS\par  PULSE OF AND HR OK\par WILL CHEK HGA1C \par OVERALL IMPROVED FOLLOWUP IN SEPTEMBER WILL SNED FOR XRAY OF LS SPINE\par WILL FOLLOWUP

## 2022-05-16 NOTE — HISTORY OF PRESENT ILLNESS
[FreeTextEntry1] : FOLLOW UP BP [de-identified] : 66 PT HERE FOR FOLLOW UP  WSA HOSPITALISED AT [facility] WITH COVID  AND [place]EN AND WAS THERE THROUGH 1/27 AND THEN WENT TO REHABE TILL END OF FEBRUATRY SEEN HERE IN [place]ARCH NOW HERE  FOR FOLLOWUP FEELS BETTER HAS SOME BACK PAIN \par HAS BEEN BACK ON METFORMIN TWICE A DAY BRINGS IN HIS   LOGS\par  PULSE OF AND HR OK

## 2022-05-19 LAB
ESTIMATED AVERAGE GLUCOSE: 108 MG/DL
HBA1C MFR BLD HPLC: 5.4 %

## 2022-05-31 ENCOUNTER — RX RENEWAL (OUTPATIENT)
Age: 67
End: 2022-05-31

## 2022-06-28 ENCOUNTER — NON-APPOINTMENT (OUTPATIENT)
Age: 67
End: 2022-06-28

## 2022-06-29 ENCOUNTER — NON-APPOINTMENT (OUTPATIENT)
Age: 67
End: 2022-06-29

## 2022-07-13 ENCOUNTER — NON-APPOINTMENT (OUTPATIENT)
Age: 67
End: 2022-07-13

## 2022-07-18 RX ORDER — EPINEPHRINE 0.3 MG/.3ML
0.3 INJECTION INTRAMUSCULAR
Qty: 2 | Refills: 0 | Status: ACTIVE | COMMUNITY
Start: 2022-07-18 | End: 1900-01-01

## 2022-08-06 ENCOUNTER — RX RENEWAL (OUTPATIENT)
Age: 67
End: 2022-08-06

## 2022-09-15 ENCOUNTER — APPOINTMENT (OUTPATIENT)
Dept: INTERNAL MEDICINE | Facility: CLINIC | Age: 67
End: 2022-09-15

## 2022-09-15 VITALS — HEART RATE: 76 BPM | SYSTOLIC BLOOD PRESSURE: 142 MMHG | DIASTOLIC BLOOD PRESSURE: 80 MMHG | OXYGEN SATURATION: 98 %

## 2022-09-15 VITALS — BODY MASS INDEX: 25.06 KG/M2 | HEIGHT: 72 IN | WEIGHT: 185 LBS

## 2022-09-15 DIAGNOSIS — K92.2 GASTROINTESTINAL HEMORRHAGE, UNSPECIFIED: ICD-10-CM

## 2022-09-15 PROCEDURE — G0008: CPT

## 2022-09-15 PROCEDURE — 90662 IIV NO PRSV INCREASED AG IM: CPT

## 2022-09-15 PROCEDURE — 36415 COLL VENOUS BLD VENIPUNCTURE: CPT

## 2022-09-15 PROCEDURE — 99215 OFFICE O/P EST HI 40 MIN: CPT | Mod: 25

## 2022-09-15 RX ORDER — AZITHROMYCIN 250 MG/1
250 TABLET, FILM COATED ORAL
Qty: 1 | Refills: 0 | Status: DISCONTINUED | COMMUNITY
Start: 2022-08-30 | End: 2022-09-15

## 2022-09-15 NOTE — PLAN
[FreeTextEntry1] : 66 PT HERE FOR FOLLOW UP  WAS HOSPITALISED AT [facility] WITH COVID  AND PNEUMONIA AND WAS THERE THROUGH 1/27 AND THEN WENT TO REHAB  TILL END OF FEBRUARY SEEN HERE IN [place]  AND MAY NOW HERE  FOR FOLLOWUP FEELS BETTER  \par HAS BEEN BACK ON METFORMIN TWICE A DAY BRINGS IN HIS   LOGS\par OVERALL ENJOYING   TIME WITH HIS 4 YR OLD  GRANDCHILD\par HAD BLOOD WORK FROM HIS ONCOLOGIST \par OVERALL FEELS OK FSG OK\par NO CP OR SOB\par LABS DRAWN TODAY\par FLU SHOT GIVEN TODAY

## 2022-09-21 LAB
ALBUMIN SERPL ELPH-MCNC: 5 G/DL
ALP BLD-CCNC: 79 U/L
ALT SERPL-CCNC: 15 U/L
ANION GAP SERPL CALC-SCNC: 11 MMOL/L
AST SERPL-CCNC: 16 U/L
BILIRUB SERPL-MCNC: 0.5 MG/DL
BUN SERPL-MCNC: 23 MG/DL
CALCIUM SERPL-MCNC: 10.3 MG/DL
CHLORIDE SERPL-SCNC: 110 MMOL/L
CHOLEST SERPL-MCNC: 119 MG/DL
CO2 SERPL-SCNC: 22 MMOL/L
CREAT SERPL-MCNC: 0.75 MG/DL
EGFR: 99 ML/MIN/1.73M2
ESTIMATED AVERAGE GLUCOSE: 120 MG/DL
GLUCOSE SERPL-MCNC: 134 MG/DL
HBA1C MFR BLD HPLC: 5.8 %
HDLC SERPL-MCNC: 30 MG/DL
LDLC SERPL CALC-MCNC: 63 MG/DL
NONHDLC SERPL-MCNC: 89 MG/DL
POTASSIUM SERPL-SCNC: 4.8 MMOL/L
PROT SERPL-MCNC: 7 G/DL
SODIUM SERPL-SCNC: 142 MMOL/L
TRIGL SERPL-MCNC: 130 MG/DL

## 2022-10-06 ENCOUNTER — APPOINTMENT (OUTPATIENT)
Dept: INTERNAL MEDICINE | Facility: CLINIC | Age: 67
End: 2022-10-06

## 2022-10-06 VITALS
WEIGHT: 185 LBS | DIASTOLIC BLOOD PRESSURE: 80 MMHG | BODY MASS INDEX: 25.06 KG/M2 | HEART RATE: 69 BPM | SYSTOLIC BLOOD PRESSURE: 148 MMHG | HEIGHT: 72 IN | OXYGEN SATURATION: 99 %

## 2022-10-06 DIAGNOSIS — L03.90 CELLULITIS, UNSPECIFIED: ICD-10-CM

## 2022-10-06 DIAGNOSIS — T14.8XXA OTHER INJURY OF UNSPECIFIED BODY REGION, INITIAL ENCOUNTER: ICD-10-CM

## 2022-10-06 DIAGNOSIS — S99.912A UNSPECIFIED INJURY OF LEFT ANKLE, INITIAL ENCOUNTER: ICD-10-CM

## 2022-10-06 PROCEDURE — 99215 OFFICE O/P EST HI 40 MIN: CPT

## 2022-10-06 RX ORDER — SILVER SULFADIAZINE 10 MG/G
1 CREAM TOPICAL TWICE DAILY
Qty: 1 | Refills: 1 | Status: ACTIVE | COMMUNITY
Start: 2022-10-06 | End: 1900-01-01

## 2022-10-06 RX ORDER — LANCETS 33 GAUGE
EACH MISCELLANEOUS
Qty: 1 | Refills: 5 | Status: DISCONTINUED | COMMUNITY
End: 2022-10-06

## 2022-10-13 ENCOUNTER — APPOINTMENT (OUTPATIENT)
Dept: INTERNAL MEDICINE | Facility: CLINIC | Age: 67
End: 2022-10-13

## 2022-10-13 VITALS
DIASTOLIC BLOOD PRESSURE: 80 MMHG | OXYGEN SATURATION: 99 % | WEIGHT: 185 LBS | BODY MASS INDEX: 25.06 KG/M2 | SYSTOLIC BLOOD PRESSURE: 118 MMHG | HEART RATE: 60 BPM | HEIGHT: 72 IN
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
## 2022-10-13 PROCEDURE — 99213 OFFICE O/P EST LOW 20 MIN: CPT

## 2022-10-13 RX ORDER — CEPHALEXIN 500 MG/1
500 CAPSULE ORAL
Qty: 21 | Refills: 0 | Status: DISCONTINUED | COMMUNITY
Start: 2022-10-06 | End: 2022-10-13

## 2022-10-13 NOTE — HISTORY OF PRESENT ILLNESS
[FreeTextEntry1] : FALL AT HOME  [de-identified] : 66 PT HERE FOR FOLLOW UP   FELL WALSKING INTO TUB \par PT HAS A CUT OUT TUB IN HIS APT BUT HE WAS TOLD HE WAS TO HAVE  A WALKIN SHOWER WITH DOOR\par PT FELT TWISTED ANKLE AND HAS BRUISING AND  ALSO WITH SOME BLISTER ON LEFT LEG \par NO FEVERS OR CHILLS NOW HERE FOR FOLLOUWP

## 2022-10-13 NOTE — PLAN
[FreeTextEntry1] : 66 PT HERE FOR FOLLOW UP   FELL WALSKING INTO TUB \par PT HAS A CUT OUT TUB IN HIS APT BUT HE WAS TOLD HE WAS TO HAVE  A Walk-in SHOWER WITH DOOR\par PT FELT TWISTED ANKLE AND HAS BRUISING AND  ALSO WITH SOME BLISTER ON LEFT LEG \par NO FEVERS OR CHILLS\par PT WITH TRAUMA FROM FALL WITH SOFT TISSUE INJURY AND ECCHYMOSIS PT WITH BLISTERING ON LEFT LEG  PLACE ON  ABX FOR POSSIBLE EARLY CELLUITIS\par   SILVADENE BID  \par

## 2022-10-13 NOTE — PLAN
[FreeTextEntry1] : 66 PT HERE FOR FOLLOW UP   FELL WALSKING INTO TUB \par PT HAS A CUT OUT TUB IN HIS APT BUT HE WAS TOLD HE WAS TO HAVE  A WALKIN SHOWER WITH DOOR\par PT FELT TWISTED ANKLE AND HAS BRUISING AND  ALSO WITH SOME BLISTER ON LEFT LEG \par NO FEVERS OR CHILLS NOW HERE FOR FOLLOUWP \par OVERALLIMPROVED HAS COMPLETED ABX \par CAN STOP SILVADEND IN A WEEK\par OVERALL STABLE\par WILL FOLLOWUP

## 2022-10-13 NOTE — PHYSICAL EXAM
[No Acute Distress] : no acute distress [Well Nourished] : well nourished [Well Developed] : well developed [Well-Appearing] : well-appearing [Normal Sclera/Conjunctiva] : normal sclera/conjunctiva [PERRL] : pupils equal round and reactive to light [EOMI] : extraocular movements intact [Normal Outer Ear/Nose] : the outer ears and nose were normal in appearance [Normal Oropharynx] : the oropharynx was normal [No JVD] : no jugular venous distention [No Lymphadenopathy] : no lymphadenopathy [Supple] : supple [Thyroid Normal, No Nodules] : the thyroid was normal and there were no nodules present [No Respiratory Distress] : no respiratory distress  [No Accessory Muscle Use] : no accessory muscle use [Clear to Auscultation] : lungs were clear to auscultation bilaterally [Normal Rate] : normal rate  [Regular Rhythm] : with a regular rhythm [Normal S1, S2] : normal S1 and S2 [No Murmur] : no murmur heard [No Carotid Bruits] : no carotid bruits [No Abdominal Bruit] : a ~M bruit was not heard ~T in the abdomen [No Varicosities] : no varicosities [Pedal Pulses Present] : the pedal pulses are present [No Edema] : there was no peripheral edema [No Palpable Aorta] : no palpable aorta [No Extremity Clubbing/Cyanosis] : no extremity clubbing/cyanosis [Soft] : abdomen soft [Non Tender] : non-tender [Non-distended] : non-distended [No Masses] : no abdominal mass palpated [No HSM] : no HSM [Normal Bowel Sounds] : normal bowel sounds [Normal Posterior Cervical Nodes] : no posterior cervical lymphadenopathy [Normal Anterior Cervical Nodes] : no anterior cervical lymphadenopathy [No CVA Tenderness] : no CVA  tenderness [No Spinal Tenderness] : no spinal tenderness [No Joint Swelling] : no joint swelling [Grossly Normal Strength/Tone] : grossly normal strength/tone [No Rash] : no rash [Coordination Grossly Intact] : coordination grossly intact [No Focal Deficits] : no focal deficits [Normal Gait] : normal gait [Deep Tendon Reflexes (DTR)] : deep tendon reflexes were 2+ and symmetric [Normal Affect] : the affect was normal [Normal Insight/Judgement] : insight and judgment were intact [de-identified] : ECCYMOSIS EDEMA OF LEFT ANKLE AN D FOOT  [de-identified] : LEFT LEG BLISTERS  ON LEFT LEG WITH SURROUNDING ERYTHEMA

## 2022-10-13 NOTE — PHYSICAL EXAM
[No Acute Distress] : no acute distress [Well Nourished] : well nourished [Well Developed] : well developed [Well-Appearing] : well-appearing [Normal Sclera/Conjunctiva] : normal sclera/conjunctiva [PERRL] : pupils equal round and reactive to light [EOMI] : extraocular movements intact [Normal Outer Ear/Nose] : the outer ears and nose were normal in appearance [Normal Oropharynx] : the oropharynx was normal [No JVD] : no jugular venous distention [No Lymphadenopathy] : no lymphadenopathy [Supple] : supple [Thyroid Normal, No Nodules] : the thyroid was normal and there were no nodules present [No Respiratory Distress] : no respiratory distress  [No Accessory Muscle Use] : no accessory muscle use [Clear to Auscultation] : lungs were clear to auscultation bilaterally [Normal Rate] : normal rate  [Regular Rhythm] : with a regular rhythm [Normal S1, S2] : normal S1 and S2 [No Murmur] : no murmur heard [No Carotid Bruits] : no carotid bruits [No Abdominal Bruit] : a ~M bruit was not heard ~T in the abdomen [No Varicosities] : no varicosities [Pedal Pulses Present] : the pedal pulses are present [No Edema] : there was no peripheral edema [No Palpable Aorta] : no palpable aorta [No Extremity Clubbing/Cyanosis] : no extremity clubbing/cyanosis [Soft] : abdomen soft [Non Tender] : non-tender [Non-distended] : non-distended [No Masses] : no abdominal mass palpated [No HSM] : no HSM [Normal Bowel Sounds] : normal bowel sounds [Normal Posterior Cervical Nodes] : no posterior cervical lymphadenopathy [Normal Anterior Cervical Nodes] : no anterior cervical lymphadenopathy [No CVA Tenderness] : no CVA  tenderness [No Spinal Tenderness] : no spinal tenderness [No Joint Swelling] : no joint swelling [Grossly Normal Strength/Tone] : grossly normal strength/tone [No Rash] : no rash [Coordination Grossly Intact] : coordination grossly intact [No Focal Deficits] : no focal deficits [Normal Gait] : normal gait [Deep Tendon Reflexes (DTR)] : deep tendon reflexes were 2+ and symmetric [Normal Affect] : the affect was normal [Normal Insight/Judgement] : insight and judgment were intact [de-identified] : LEFT LEG BLISTERS RESOLVED ECYMOSIS RESOLVING SMALL AMOUNT ON TOES

## 2022-10-13 NOTE — HISTORY OF PRESENT ILLNESS
[FreeTextEntry1] : FALL AT HOME  [de-identified] : 66 PT HERE FOR FOLLOW UP   FELL WALSKING INTO TUB \par PT HAS A CUT OUT TUB IN HIS APT BUT HE WAS TOLD HE WAS TO HAVE  A WALKIN SHOWER WITH DOOR\par PT FELT TWISTED ANKLE AND HAS BRUISING AND  ALSO WITH SOME BLISTER ON LEFT LEG \par NO FEVERS OR CHILLS NOW HERE FOR EVAL

## 2022-12-15 ENCOUNTER — APPOINTMENT (OUTPATIENT)
Dept: INTERNAL MEDICINE | Facility: CLINIC | Age: 67
End: 2022-12-15

## 2022-12-15 ENCOUNTER — APPOINTMENT (OUTPATIENT)
Dept: OTOLARYNGOLOGY | Facility: CLINIC | Age: 67
End: 2022-12-15

## 2022-12-15 VITALS
OXYGEN SATURATION: 99 % | HEART RATE: 73 BPM | HEIGHT: 72 IN | WEIGHT: 195 LBS | BODY MASS INDEX: 26.41 KG/M2 | SYSTOLIC BLOOD PRESSURE: 100 MMHG | DIASTOLIC BLOOD PRESSURE: 70 MMHG

## 2022-12-15 VITALS
BODY MASS INDEX: 26.41 KG/M2 | TEMPERATURE: 98 F | DIASTOLIC BLOOD PRESSURE: 81 MMHG | HEIGHT: 72 IN | WEIGHT: 195 LBS | HEART RATE: 71 BPM | SYSTOLIC BLOOD PRESSURE: 157 MMHG

## 2022-12-15 DIAGNOSIS — K21.9 GASTRO-ESOPHAGEAL REFLUX DISEASE W/OUT ESOPHAGITIS: ICD-10-CM

## 2022-12-15 DIAGNOSIS — J31.0 CHRONIC RHINITIS: ICD-10-CM

## 2022-12-15 DIAGNOSIS — N40.0 BENIGN PROSTATIC HYPERPLASIA WITHOUT LOWER URINARY TRACT SYMPMS: ICD-10-CM

## 2022-12-15 PROCEDURE — 99204 OFFICE O/P NEW MOD 45 MIN: CPT | Mod: 25

## 2022-12-15 PROCEDURE — 36415 COLL VENOUS BLD VENIPUNCTURE: CPT

## 2022-12-15 PROCEDURE — 99214 OFFICE O/P EST MOD 30 MIN: CPT | Mod: 25

## 2022-12-15 PROCEDURE — 31575 DIAGNOSTIC LARYNGOSCOPY: CPT

## 2022-12-15 RX ORDER — IPRATROPIUM BROMIDE 21 UG/1
0.03 SPRAY NASAL
Qty: 1 | Refills: 6 | Status: ACTIVE | COMMUNITY
Start: 2022-12-15 | End: 1900-01-01

## 2022-12-15 RX ORDER — IPRATROPIUM BROMIDE 21 UG/1
0.03 SPRAY NASAL
Qty: 1 | Refills: 2 | Status: DISCONTINUED | COMMUNITY
Start: 2022-12-15 | End: 2022-12-15

## 2022-12-15 RX ORDER — CEFUROXIME AXETIL 500 MG/1
500 TABLET ORAL
Qty: 8 | Refills: 0 | Status: DISCONTINUED | COMMUNITY
Start: 2022-01-27 | End: 2022-12-15

## 2022-12-15 RX ORDER — OMEPRAZOLE 20 MG/1
20 CAPSULE, DELAYED RELEASE ORAL DAILY
Qty: 30 | Refills: 6 | Status: ACTIVE | COMMUNITY
Start: 2022-12-15 | End: 1900-01-01

## 2022-12-15 RX ORDER — OMEPRAZOLE 20 MG/1
20 CAPSULE, DELAYED RELEASE ORAL DAILY
Qty: 90 | Refills: 2 | Status: DISCONTINUED | COMMUNITY
Start: 2022-12-15 | End: 2022-12-15

## 2022-12-15 NOTE — ASSESSMENT
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 Well maintained on atrovent and omeprazole.

## 2022-12-15 NOTE — PHYSICAL EXAM
[No Acute Distress] : no acute distress [Well Nourished] : well nourished [Well Developed] : well developed [Well-Appearing] : well-appearing [Normal Sclera/Conjunctiva] : normal sclera/conjunctiva [PERRL] : pupils equal round and reactive to light [EOMI] : extraocular movements intact [Normal Outer Ear/Nose] : the outer ears and nose were normal in appearance [Normal Oropharynx] : the oropharynx was normal [No JVD] : no jugular venous distention [No Lymphadenopathy] : no lymphadenopathy [Supple] : supple [Thyroid Normal, No Nodules] : the thyroid was normal and there were no nodules present [No Respiratory Distress] : no respiratory distress  [No Accessory Muscle Use] : no accessory muscle use [Clear to Auscultation] : lungs were clear to auscultation bilaterally [Normal Rate] : normal rate  [Regular Rhythm] : with a regular rhythm [Normal S1, S2] : normal S1 and S2 [No Murmur] : no murmur heard [No Carotid Bruits] : no carotid bruits [No Abdominal Bruit] : a ~M bruit was not heard ~T in the abdomen [No Varicosities] : no varicosities [Pedal Pulses Present] : the pedal pulses are present [No Edema] : there was no peripheral edema [No Palpable Aorta] : no palpable aorta [No Extremity Clubbing/Cyanosis] : no extremity clubbing/cyanosis [Soft] : abdomen soft [Non Tender] : non-tender [Non-distended] : non-distended [No Masses] : no abdominal mass palpated [No HSM] : no HSM [Normal Bowel Sounds] : normal bowel sounds [Normal Posterior Cervical Nodes] : no posterior cervical lymphadenopathy [Normal Anterior Cervical Nodes] : no anterior cervical lymphadenopathy [No CVA Tenderness] : no CVA  tenderness [No Joint Swelling] : no joint swelling [No Spinal Tenderness] : no spinal tenderness [Grossly Normal Strength/Tone] : grossly normal strength/tone [No Rash] : no rash [Coordination Grossly Intact] : coordination grossly intact [No Focal Deficits] : no focal deficits [Normal Gait] : normal gait [Deep Tendon Reflexes (DTR)] : deep tendon reflexes were 2+ and symmetric [Normal Affect] : the affect was normal [Normal Insight/Judgement] : insight and judgment were intact [de-identified] : ECCYMOSIS EDEMA OF LEFT ANKLE AN D FOOT  [de-identified] : LEFT LEG BLISTERS  ON LEFT LEG WITH SURROUNDING ERYTHEMA

## 2022-12-15 NOTE — HISTORY OF PRESENT ILLNESS
[de-identified] : 67 year old male referred by Dr Sahu for general ENT check up. States used to see ENT Dr Daily 6 months ago, presents for check up. States has anterior rhinorrhea when he eats uses Ipratropium Bromide spray, and Omeprazole. \par Patient denies otalgia, otorrhea, throat/ ear infections, hearing loss, tinnitus, dizziness, vertigo, nasal congestion, post nasal drip, sinus pain and pressure, poor sense of smell,  dysphagia, odynophagia, dyspnea, or dysphonia.\par  \par \par  \par \par

## 2022-12-15 NOTE — CONSULT LETTER
[Dear  ___] : Dear  [unfilled], [Consult Letter:] : I had the pleasure of evaluating your patient, [unfilled]. [Please see my note below.] : Please see my note below. [Consult Closing:] : Thank you very much for allowing me to participate in the care of this patient.  If you have any questions, please do not hesitate to contact me. [Sincerely,] : Sincerely, [FreeTextEntry2] : Dr Sahu [FreeTextEntry3] : Jd Harrell MD, RAFAEL\par Otolaryngology \par Sinus and Endoscopic Skull Base Surgery \par Head and Neck Surgery\par \par [address], [facility] [facility] [address]\[place] [address]\par \par [address] [address]\par \par Tel: [phone]\par Fax:[phone]

## 2022-12-15 NOTE — PROCEDURE
[FreeTextEntry6] : Bilateral nasal endoscopy:\par \par Left:\par Septum midline\par Mild inferior turbinate hypertrophy \par Middle meatus clear, without masses, polyps, or pus\par Sphenoethmoidal recess clear, without masses, polyps, or pus\par \par Right: \par Septum midline\par Mild inferior turbinate hypertrophy - somewhat truncated appearance\par Middle meatus clear, without masses, polyps, or pus \par Sphenoethmoidal recess clear, without masses, polyps, or pus\par \par  [de-identified] : NPL:\par Nasopharynx clear without masses\par Base of tongue without masses or lesions\par Vallecula clear\par Pyriforms clear\par Epiglottis crisp\par TVFs mobile bilaterally \par Glottic airway patent\par

## 2022-12-15 NOTE — HISTORY OF PRESENT ILLNESS
[FreeTextEntry1] : FOLLOWUP ON DM  [de-identified] : 67 PT HERE FOR FOLLOW UP ON DM FEEEL WELL NO FURTHER FALLS \par BRINGS IN FSG LOG AND  SUGARS OK

## 2022-12-15 NOTE — PLAN
[FreeTextEntry1] :  67 PT HERE FOR FOLLOW UP ON DM FEEEL WELL NO FURTHER FALLS \par BRINGS IN FSG LOG AND  SUGARS OK \par NO CP OR SOB LN HAVE COME DOWN SEES ONCOLOGIST NEXT MONTH\par DM CONTINUE PRESENT REGIMEN\par WILL CHECK LABS\par PT ON CHRONIC PAIN MEDS FOR YEARS DOES NOT ABUSE THEM NEVER CALLS IN FOR EXTRA\par UNFORTUNTATLEY WAS ON THEM FRO M OTHER DOCTORS FOR YEARS AND UNALBE TO WEAN OFF \par WILL FOLLOUWP \par OVERALL STABLE ALREADY HAD FLU SHOT

## 2022-12-16 ENCOUNTER — RX RENEWAL (OUTPATIENT)
Age: 67
End: 2022-12-16

## 2022-12-16 LAB — PSA SERPL-MCNC: 0.18 NG/ML

## 2022-12-18 ENCOUNTER — RX RENEWAL (OUTPATIENT)
Age: 67
End: 2022-12-18

## 2022-12-20 ENCOUNTER — RX RENEWAL (OUTPATIENT)
Age: 67
End: 2022-12-20

## 2023-03-16 ENCOUNTER — APPOINTMENT (OUTPATIENT)
Dept: INTERNAL MEDICINE | Facility: CLINIC | Age: 68
End: 2023-03-16
Payer: MEDICARE

## 2023-03-16 VITALS
OXYGEN SATURATION: 98 % | HEART RATE: 60 BPM | SYSTOLIC BLOOD PRESSURE: 112 MMHG | DIASTOLIC BLOOD PRESSURE: 80 MMHG | WEIGHT: 190 LBS | BODY MASS INDEX: 25.73 KG/M2 | HEIGHT: 72 IN

## 2023-03-16 PROCEDURE — 99215 OFFICE O/P EST HI 40 MIN: CPT

## 2023-03-16 NOTE — HEALTH RISK ASSESSMENT
[0] : 2) Feeling down, depressed, or hopeless: Not at all (0) [PHQ-2 Negative - No further assessment needed] : PHQ-2 Negative - No further assessment needed [[id number]] : 0

## 2023-03-16 NOTE — HISTORY OF PRESENT ILLNESS
[FreeTextEntry1] : FOLLOWUP ON DM  AND LYMPHOMA [de-identified] : 67 PT HERE FOR FOLLOW UP ON DM FEEL WELL NO FURTHER FALLS \par BRINGS IN FSG LOG AND  SUGARs low \par INCREASED LN ON CT OF NECK IN [place] SEES ONCOLOGY WHO MAY START  CHEMO ON HIS NEXT VISIT

## 2023-03-16 NOTE — PLAN
[FreeTextEntry1] : 67 PT HERE FOR FOLLOW UP ON DM FEEL WELL NO FURTHER FALLS \par BRINGS IN FSG LOG AND  SUGARs low \par INCREASED LN ON CT OF NECK IN [place] SEES ONCOLOGY WHO MAY START  CHEMO ON HIS NEXT VISIT\par PT WITH RECENT LABS DOEN BY ONCOLGOY\par WILL D/C METFORMIN\par WILL FOLLOWUP WITH  ONCOLOGY \par LN ENLARGED ON CT SCAN WILL LIKELY START CHEMO\par OVERALL STABLEPT WITH [facility] ON CHRONIC OPIOD DOES NOT ABUSE THEM DOES NOT CALL [facility] FOR MEDS\par LAST FILLED IN [facility] \par WILL FOLLOW UP

## 2023-03-16 NOTE — PHYSICAL EXAM
[No Acute Distress] : no acute distress [Well Nourished] : well nourished [Well Developed] : well developed [Well-Appearing] : well-appearing [Normal Sclera/Conjunctiva] : normal sclera/conjunctiva [PERRL] : pupils equal round and reactive to light [EOMI] : extraocular movements intact [Normal Outer Ear/Nose] : the outer ears and nose were normal in appearance [Normal Oropharynx] : the oropharynx was normal [No JVD] : no jugular venous distention [No Lymphadenopathy] : no lymphadenopathy [Supple] : supple [Thyroid Normal, No Nodules] : the thyroid was normal and there were no nodules present [No Respiratory Distress] : no respiratory distress  [No Accessory Muscle Use] : no accessory muscle use [Clear to Auscultation] : lungs were clear to auscultation bilaterally [Normal Rate] : normal rate  [Regular Rhythm] : with a regular rhythm [Normal S1, S2] : normal S1 and S2 [No Murmur] : no murmur heard [No Carotid Bruits] : no carotid bruits [No Abdominal Bruit] : a ~M bruit was not heard ~T in the abdomen [No Varicosities] : no varicosities [Pedal Pulses Present] : the pedal pulses are present [No Edema] : there was no peripheral edema [No Palpable Aorta] : no palpable aorta [No Extremity Clubbing/Cyanosis] : no extremity clubbing/cyanosis [Soft] : abdomen soft [Non Tender] : non-tender [Non-distended] : non-distended [No Masses] : no abdominal mass palpated [No HSM] : no HSM [Normal Bowel Sounds] : normal bowel sounds [No CVA Tenderness] : no CVA  tenderness [No Spinal Tenderness] : no spinal tenderness [No Joint Swelling] : no joint swelling [Grossly Normal Strength/Tone] : grossly normal strength/tone [No Rash] : no rash [Coordination Grossly Intact] : coordination grossly intact [No Focal Deficits] : no focal deficits [Normal Gait] : normal gait [Deep Tendon Reflexes (DTR)] : deep tendon reflexes were 2+ and symmetric [Normal Affect] : the affect was normal [Normal Insight/Judgement] : insight and judgment were intact [de-identified] : ENLARGED LN RIGHT SUPRACLAAVICULAR [de-identified] : ECCYMOSIS EDEMA OF LEFT ANKLE AN D FOOT  [de-identified] : LEFT LEG BLISTERS  ON LEFT LEG WITH SURROUNDING ERYTHEMA

## 2023-04-17 NOTE — HEALTH RISK ASSESSMENT
Patient's (Body mass index is 28.07 kg/m².) indicates that they are overweight with health conditions that include none . Weight is unchanged. BMI is is above average; BMI management plan is completed. We discussed healthy diet and exercise   [] : No [No falls in past year] : Patient reported no falls in the past year [0] : 2) Feeling down, depressed, or hopeless: Not at all (0)

## 2023-04-27 ENCOUNTER — APPOINTMENT (OUTPATIENT)
Dept: INTERNAL MEDICINE | Facility: CLINIC | Age: 68
End: 2023-04-27
Payer: MEDICARE

## 2023-04-27 VITALS
WEIGHT: 184 LBS | SYSTOLIC BLOOD PRESSURE: 122 MMHG | OXYGEN SATURATION: 99 % | BODY MASS INDEX: 24.92 KG/M2 | DIASTOLIC BLOOD PRESSURE: 80 MMHG | HEIGHT: 72 IN | HEART RATE: 64 BPM

## 2023-04-27 DIAGNOSIS — J06.9 ACUTE UPPER RESPIRATORY INFECTION, UNSPECIFIED: ICD-10-CM

## 2023-04-27 PROCEDURE — 99214 OFFICE O/P EST MOD 30 MIN: CPT

## 2023-04-27 NOTE — HISTORY OF PRESENT ILLNESS
[FreeTextEntry1] : FOLLOWUP      COUGH URI  [de-identified] : 67 PT HERE FOR FOLLOW UP WITH COUGH CONGESTION WAS GIVEN AUGMENTIN AND FEELS BETTER COUGHT RESOLVING\par NO CP \par PT WITH LYMPHOMA AND REPORTS HE WILL BE NEEDING CEHMO AND WILL BE FOLLOWING UP WITH PSYCHIATRIST

## 2023-04-27 NOTE — PLAN
[FreeTextEntry1] : 67 PT HERE FOR FOLLOW UP WITH COUGH CONGESTION WAS GIVEN Augmentin AND FEELS BETTER COUGH  RESOLVING\par NO CP \par PT WITH LYMPHOMA AND REPORTS HE WILL BE NEEDING CHEMO AND WILL BE FOLLOWING UP WITH ONCOLOGIST\par WILL FOLLOWUP PT BROUGHT LABS REVIIEWED

## 2023-05-15 ENCOUNTER — APPOINTMENT (OUTPATIENT)
Dept: INTERNAL MEDICINE | Facility: CLINIC | Age: 68
End: 2023-05-15

## 2023-06-13 ENCOUNTER — APPOINTMENT (OUTPATIENT)
Dept: OTOLARYNGOLOGY | Facility: CLINIC | Age: 68
End: 2023-06-13

## 2023-06-15 ENCOUNTER — APPOINTMENT (OUTPATIENT)
Dept: INTERNAL MEDICINE | Facility: CLINIC | Age: 68
End: 2023-06-15
Payer: MEDICARE

## 2023-06-15 VITALS
DIASTOLIC BLOOD PRESSURE: 78 MMHG | SYSTOLIC BLOOD PRESSURE: 118 MMHG | BODY MASS INDEX: 23.7 KG/M2 | HEIGHT: 72 IN | OXYGEN SATURATION: 99 % | WEIGHT: 175 LBS | HEART RATE: 53 BPM

## 2023-06-15 DIAGNOSIS — Z00.00 ENCOUNTER FOR GENERAL ADULT MEDICAL EXAMINATION W/OUT ABNORMAL FINDINGS: ICD-10-CM

## 2023-06-15 PROCEDURE — 36415 COLL VENOUS BLD VENIPUNCTURE: CPT

## 2023-06-15 PROCEDURE — 99214 OFFICE O/P EST MOD 30 MIN: CPT | Mod: 25

## 2023-06-15 RX ORDER — AZITHROMYCIN 250 MG/1
250 TABLET, FILM COATED ORAL
Qty: 1 | Refills: 0 | Status: DISCONTINUED | COMMUNITY
Start: 2023-03-28 | End: 2023-06-15

## 2023-06-15 RX ORDER — CEFPODOXIME PROXETIL 200 MG/1
200 TABLET, FILM COATED ORAL
Qty: 20 | Refills: 0 | Status: DISCONTINUED | COMMUNITY
Start: 2023-04-20 | End: 2023-06-15

## 2023-06-15 NOTE — HISTORY OF PRESENT ILLNESS
[FreeTextEntry1] : FOLLOWUP     CLL HTN PREDIABETE [de-identified] : 67 PT HERE FOR FOLLOW UP \par NO CP \par PT WITH LYMPHOMA AND  HAS STARTED CHEMO WITH IMBRANCE \par OVERALL FEELS OK  HAS LOST WEIGHT  BRINGS IN FSG LOG

## 2023-06-15 NOTE — PLAN
[FreeTextEntry1] : 67 PT HERE FOR FOLLOW UP \par NO CP \par PT WITH LYMPHOMA AND  HAS STARTED CHEMO WITH IMBRANCE \par OVERALL FEELS OK  HAS LOST WEIGHT  BRINGS IN FSG LOG [facility] ARE GOOD \par PT ON NO MEDS\par BP IS OK  IF  STAYS LOW  MAY RECOMMEND DECREASE TO 50MG\par PT WITH BRUSING ON ARMS WILL CHECK LABS CBC\par WILL CHECK CHOL \par ADN CNEMITSTIRES LFTS\par RENREWED PAIN MEDS  NOT ABUSIN THEM ADN HAS BEEN ON FOR MANY YEARS\par WILL FOLLOW UP

## 2023-06-20 LAB
ALBUMIN SERPL ELPH-MCNC: 4.9 G/DL
ALP BLD-CCNC: 64 U/L
ALT SERPL-CCNC: 19 U/L
ANION GAP SERPL CALC-SCNC: 12 MMOL/L
AST SERPL-CCNC: 28 U/L
BILIRUB SERPL-MCNC: 0.6 MG/DL
BUN SERPL-MCNC: 26 MG/DL
CALCIUM SERPL-MCNC: 9.8 MG/DL
CHLORIDE SERPL-SCNC: 106 MMOL/L
CHOLEST SERPL-MCNC: 113 MG/DL
CO2 SERPL-SCNC: 21 MMOL/L
CREAT SERPL-MCNC: 0.85 MG/DL
EGFR: 95 ML/MIN/1.73M2
ESTIMATED AVERAGE GLUCOSE: 111 MG/DL
GLUCOSE SERPL-MCNC: 137 MG/DL
HBA1C MFR BLD HPLC: 5.5 %
HCT VFR BLD CALC: 38.2 %
HDLC SERPL-MCNC: 30 MG/DL
HGB BLD-MCNC: 12.5 G/DL
LDLC SERPL CALC-MCNC: 62 MG/DL
MCHC RBC-ENTMCNC: 31.6 PG
MCHC RBC-ENTMCNC: 32.7 GM/DL
MCV RBC AUTO: 96.7 FL
NONHDLC SERPL-MCNC: 84 MG/DL
PLATELET # BLD AUTO: 126 K/UL
POTASSIUM SERPL-SCNC: 5 MMOL/L
PROT SERPL-MCNC: 7.3 G/DL
RBC # BLD: 3.95 M/UL
RBC # FLD: 15 %
SODIUM SERPL-SCNC: 140 MMOL/L
TRIGL SERPL-MCNC: 107 MG/DL
WBC # FLD AUTO: 10.66 K/UL

## 2023-06-21 ENCOUNTER — NON-APPOINTMENT (OUTPATIENT)
Age: 68
End: 2023-06-21

## 2023-08-21 ENCOUNTER — APPOINTMENT (OUTPATIENT)
Dept: INTERNAL MEDICINE | Facility: CLINIC | Age: 68
End: 2023-08-21
Payer: MEDICARE

## 2023-08-21 VITALS
DIASTOLIC BLOOD PRESSURE: 80 MMHG | WEIGHT: 175 LBS | BODY MASS INDEX: 23.7 KG/M2 | HEIGHT: 72 IN | SYSTOLIC BLOOD PRESSURE: 124 MMHG

## 2023-08-21 DIAGNOSIS — R05.9 COUGH, UNSPECIFIED: ICD-10-CM

## 2023-08-21 DIAGNOSIS — Z09 ENCOUNTER FOR FOLLOW-UP EXAMINATION AFTER COMPLETED TREATMENT FOR CONDITIONS OTHER THAN MALIGNANT NEOPLASM: ICD-10-CM

## 2023-08-21 PROCEDURE — 36415 COLL VENOUS BLD VENIPUNCTURE: CPT

## 2023-08-21 PROCEDURE — 99214 OFFICE O/P EST MOD 30 MIN: CPT | Mod: 25

## 2023-08-21 NOTE — HISTORY OF PRESENT ILLNESS
[FreeTextEntry1] : [facility]  [de-identified] : 67 PT HERE FOR FOLLOW UP  S/P HOSPITAL STAY AT [facility] PT WAS ON IMBRUVUCA FROM ONOCLOGY NO CP  PT WAS REPORTEDLY WEAK AND CONFUSED  AND WSA NEUTROPENID WITH ?PNEUMONIA  OVERALL FEELS BETTER

## 2023-08-21 NOTE — ASSESSMENT
[FreeTextEntry1] : 67 PT HERE FOR FOLLOW UP  S/P HOSPITAL STAY AT [facility] PT WAS ON IMBRUVUCA FROM ONOCLOGY NO CP  PT WAS REPORTEDLY WEAK AND CONFUSED  AND WSA NEUTROPENIC WITH ?PNEUMONIA  OVERALL FEELS BETTER     GETTING BACK TO HIMSELF PT RPROTS FAMILY DOESNT WAN THIM GIANCARLO GRANDCHILD PT  IS AWAKE ALERT NON TOXIC  I BELEVE HE IS ABLE TO DRIVE PT IS TO FOLLOWP WITH ONCOLOGY IN [place] AND DECIDE ABOUT FURHTER CHEMO PT ON CHRONIC PAIN MEDS URIELT DID NOT ASK FOR REFILL TODAY

## 2023-08-21 NOTE — REVIEW OF SYSTEMS
[Recent Change In Weight] : ~T recent weight change [Negative] : Heme/Lymph [FreeTextEntry2] : WT LOSS

## 2023-08-30 LAB
ALBUMIN SERPL ELPH-MCNC: 4.5 G/DL
ALP BLD-CCNC: 107 U/L
ALT SERPL-CCNC: 8 U/L
ANION GAP SERPL CALC-SCNC: 13 MMOL/L
AST SERPL-CCNC: 14 U/L
BILIRUB SERPL-MCNC: 0.6 MG/DL
BUN SERPL-MCNC: 21 MG/DL
CALCIUM SERPL-MCNC: 9.7 MG/DL
CHLORIDE SERPL-SCNC: 105 MMOL/L
CO2 SERPL-SCNC: 23 MMOL/L
CREAT SERPL-MCNC: 0.92 MG/DL
EGFR: 91 ML/MIN/1.73M2
GLUCOSE SERPL-MCNC: 121 MG/DL
HCT VFR BLD CALC: 36.8 %
HGB BLD-MCNC: 11.6 G/DL
MCHC RBC-ENTMCNC: 30.1 PG
MCHC RBC-ENTMCNC: 31.5 GM/DL
MCV RBC AUTO: 95.3 FL
PLATELET # BLD AUTO: 252 K/UL
POTASSIUM SERPL-SCNC: 4.8 MMOL/L
PROT SERPL-MCNC: 7 G/DL
RBC # BLD: 3.86 M/UL
RBC # FLD: 14.1 %
SODIUM SERPL-SCNC: 141 MMOL/L
WBC # FLD AUTO: 6.04 K/UL

## 2023-09-11 ENCOUNTER — NON-APPOINTMENT (OUTPATIENT)
Age: 68
End: 2023-09-11

## 2023-09-28 ENCOUNTER — APPOINTMENT (OUTPATIENT)
Dept: INTERNAL MEDICINE | Facility: CLINIC | Age: 68
End: 2023-09-28
Payer: MEDICARE

## 2023-09-28 VITALS
WEIGHT: 171 LBS | DIASTOLIC BLOOD PRESSURE: 80 MMHG | HEIGHT: 72 IN | BODY MASS INDEX: 23.16 KG/M2 | SYSTOLIC BLOOD PRESSURE: 130 MMHG

## 2023-09-28 DIAGNOSIS — R58 HEMORRHAGE, NOT ELSEWHERE CLASSIFIED: ICD-10-CM

## 2023-09-28 PROCEDURE — 99214 OFFICE O/P EST MOD 30 MIN: CPT | Mod: 25

## 2023-09-28 PROCEDURE — 90662 IIV NO PRSV INCREASED AG IM: CPT

## 2023-09-28 PROCEDURE — G0008: CPT

## 2023-09-29 ENCOUNTER — NON-APPOINTMENT (OUTPATIENT)
Age: 68
End: 2023-09-29

## 2023-10-16 DIAGNOSIS — M84.30XA STRESS FRACTURE, UNSPECIFIED SITE, INITIAL ENCOUNTER FOR FRACTURE: ICD-10-CM

## 2023-10-16 RX ORDER — MELOXICAM 7.5 MG/1
7.5 TABLET ORAL DAILY
Qty: 90 | Refills: 1 | Status: ACTIVE | COMMUNITY
Start: 1900-01-01 | End: 1900-01-01

## 2023-10-30 ENCOUNTER — NON-APPOINTMENT (OUTPATIENT)
Age: 68
End: 2023-10-30

## 2023-10-30 ENCOUNTER — APPOINTMENT (OUTPATIENT)
Dept: OPHTHALMOLOGY | Facility: CLINIC | Age: 68
End: 2023-10-30
Payer: MEDICARE

## 2023-10-30 PROCEDURE — 92133 CPTRZD OPH DX IMG PST SGM ON: CPT

## 2023-10-30 PROCEDURE — 92004 COMPRE OPH EXAM NEW PT 1/>: CPT

## 2023-11-07 RX ORDER — CHLORHEXIDINE GLUCONATE 4 %
325 (65 FE) LIQUID (ML) TOPICAL
Qty: 90 | Refills: 3 | Status: ACTIVE | COMMUNITY
Start: 2022-04-07 | End: 1900-01-01

## 2023-12-20 ENCOUNTER — APPOINTMENT (OUTPATIENT)
Dept: OPHTHALMOLOGY | Facility: CLINIC | Age: 68
End: 2023-12-20
Payer: MEDICARE

## 2023-12-20 ENCOUNTER — NON-APPOINTMENT (OUTPATIENT)
Age: 68
End: 2023-12-20

## 2023-12-20 PROCEDURE — 76514 ECHO EXAM OF EYE THICKNESS: CPT

## 2023-12-20 PROCEDURE — 92083 EXTENDED VISUAL FIELD XM: CPT

## 2023-12-20 PROCEDURE — 92012 INTRM OPH EXAM EST PATIENT: CPT

## 2023-12-28 ENCOUNTER — APPOINTMENT (OUTPATIENT)
Dept: INTERNAL MEDICINE | Facility: CLINIC | Age: 68
End: 2023-12-28
Payer: MEDICARE

## 2023-12-28 VITALS
BODY MASS INDEX: 24.38 KG/M2 | HEART RATE: 72 BPM | DIASTOLIC BLOOD PRESSURE: 78 MMHG | SYSTOLIC BLOOD PRESSURE: 130 MMHG | WEIGHT: 180 LBS | HEIGHT: 72 IN | OXYGEN SATURATION: 98 %

## 2023-12-28 DIAGNOSIS — R73.03 PREDIABETES.: ICD-10-CM

## 2023-12-28 DIAGNOSIS — C85.90 NON-HODGKIN LYMPHOMA, UNSPECIFIED, UNSPECIFIED SITE: ICD-10-CM

## 2023-12-28 DIAGNOSIS — E11.9 TYPE 2 DIABETES MELLITUS W/OUT COMPLICATIONS: ICD-10-CM

## 2023-12-28 DIAGNOSIS — R97.20 ELEVATED PROSTATE, SPECIFIC ANTIGEN [PSA]: ICD-10-CM

## 2023-12-28 PROCEDURE — 99215 OFFICE O/P EST HI 40 MIN: CPT | Mod: 25

## 2023-12-28 PROCEDURE — 36415 COLL VENOUS BLD VENIPUNCTURE: CPT

## 2023-12-28 RX ORDER — LOSARTAN POTASSIUM 100 MG/1
100 TABLET, FILM COATED ORAL
Qty: 90 | Refills: 3 | Status: ACTIVE | COMMUNITY
Start: 2018-11-02 | End: 1900-01-01

## 2023-12-28 RX ORDER — LOVASTATIN 40 MG/1
40 TABLET ORAL
Qty: 90 | Refills: 3 | Status: ACTIVE | COMMUNITY
Start: 2021-12-11 | End: 1900-01-01

## 2023-12-28 RX ORDER — METFORMIN HYDROCHLORIDE 500 MG/1
500 TABLET, COATED ORAL
Qty: 90 | Refills: 1 | Status: DISCONTINUED | COMMUNITY
Start: 2022-08-07 | End: 2023-12-28

## 2023-12-28 RX ORDER — GEMFIBROZIL 600 MG/1
600 TABLET, FILM COATED ORAL
Qty: 180 | Refills: 3 | Status: ACTIVE | COMMUNITY
Start: 2021-12-11 | End: 1900-01-01

## 2023-12-28 NOTE — HISTORY OF PRESENT ILLNESS
[FreeTextEntry1] : FOLLOWUP ON HTN [de-identified] : 68 PT HERE FOR FOLLOW UP    PT WAS ON IMBRUVUCA FROM ONOCLOGY BUT IT HAS BEEN DISCCONTINUED NO CP  AND FEELS MUCH BETTER OFF THE CHEMO  NO FEVERS OR CHIILS

## 2023-12-28 NOTE — PLAN
[FreeTextEntry1] :  68 PT HERE FOR FOLLOW UP    PT WAS ON IMBRUVUCA FROM ONOCLOGY BUT IT HAS BEEN DISCCONTINUED NO CP  AND FEELS MUCH BETTER OFF THE  CHEMO  NO FEVERS OR CHIILS  WILL CHECK LABS RENEWED MEDS  PT ON CHRONIC HYDORCODONE AND DOES NOT ABUSE IT HAS BEEN ON IT FOR YEARS EVEN BEFORE HE BECAME MY PT  DOES NOT CALL EARLY

## 2024-01-07 LAB
ALBUMIN SERPL ELPH-MCNC: 5.2 G/DL
ALP BLD-CCNC: 75 U/L
ALT SERPL-CCNC: 10 U/L
ANION GAP SERPL CALC-SCNC: 12 MMOL/L
AST SERPL-CCNC: 18 U/L
BASOPHILS # BLD AUTO: 0.03 K/UL
BASOPHILS NFR BLD AUTO: 0.7 %
BILIRUB SERPL-MCNC: 0.6 MG/DL
BUN SERPL-MCNC: 29 MG/DL
CALCIUM SERPL-MCNC: 9.8 MG/DL
CHLORIDE SERPL-SCNC: 105 MMOL/L
CHOLEST SERPL-MCNC: 132 MG/DL
CO2 SERPL-SCNC: 22 MMOL/L
CREAT SERPL-MCNC: 0.94 MG/DL
EGFR: 88 ML/MIN/1.73M2
EOSINOPHIL # BLD AUTO: 0.09 K/UL
EOSINOPHIL NFR BLD AUTO: 2.2 %
ESTIMATED AVERAGE GLUCOSE: 97 MG/DL
GLUCOSE SERPL-MCNC: 142 MG/DL
HBA1C MFR BLD HPLC: 5 %
HCT VFR BLD CALC: 38.2 %
HDLC SERPL-MCNC: 28 MG/DL
HGB BLD-MCNC: 12.4 G/DL
IMM GRANULOCYTES NFR BLD AUTO: 0.5 %
LDLC SERPL CALC-MCNC: 77 MG/DL
LYMPHOCYTES # BLD AUTO: 1.84 K/UL
LYMPHOCYTES NFR BLD AUTO: 45.9 %
MAN DIFF?: NORMAL
MCHC RBC-ENTMCNC: 29.8 PG
MCHC RBC-ENTMCNC: 32.5 GM/DL
MCV RBC AUTO: 91.8 FL
MONOCYTES # BLD AUTO: 0.14 K/UL
MONOCYTES NFR BLD AUTO: 3.5 %
NEUTROPHILS # BLD AUTO: 1.89 K/UL
NEUTROPHILS NFR BLD AUTO: 47.2 %
NONHDLC SERPL-MCNC: 104 MG/DL
PLATELET # BLD AUTO: 119 K/UL
POTASSIUM SERPL-SCNC: 4.4 MMOL/L
PROT SERPL-MCNC: 7.1 G/DL
PSA SERPL-MCNC: 0.06 NG/ML
RBC # BLD: 4.16 M/UL
RBC # FLD: 14.8 %
SODIUM SERPL-SCNC: 139 MMOL/L
TRIGL SERPL-MCNC: 152 MG/DL
WBC # FLD AUTO: 4.01 K/UL

## 2024-02-17 NOTE — HISTORY OF PRESENT ILLNESS
[FreeTextEntry1] : Lump behind Right ear [de-identified] : PT HERE FOR FOLLOWUP HAD COMPLAINTS OF BUMP BEHIND RIGHT EAR  DVELOPED 48HRS AGO NO TRAUMA THINKS IT MIGHT BE SPIDER BITE [employer] Safety Interventions

## 2024-03-21 ENCOUNTER — APPOINTMENT (OUTPATIENT)
Dept: INTERNAL MEDICINE | Facility: CLINIC | Age: 69
End: 2024-03-21
Payer: MEDICARE

## 2024-03-21 VITALS
HEIGHT: 72 IN | DIASTOLIC BLOOD PRESSURE: 70 MMHG | BODY MASS INDEX: 23.57 KG/M2 | WEIGHT: 174 LBS | SYSTOLIC BLOOD PRESSURE: 130 MMHG

## 2024-03-21 DIAGNOSIS — D64.9 ANEMIA, UNSPECIFIED: ICD-10-CM

## 2024-03-21 DIAGNOSIS — M19.90 UNSPECIFIED OSTEOARTHRITIS, UNSPECIFIED SITE: ICD-10-CM

## 2024-03-21 DIAGNOSIS — I10 ESSENTIAL (PRIMARY) HYPERTENSION: ICD-10-CM

## 2024-03-21 PROCEDURE — 99214 OFFICE O/P EST MOD 30 MIN: CPT

## 2024-03-21 PROCEDURE — G2211 COMPLEX E/M VISIT ADD ON: CPT

## 2024-03-21 NOTE — PLAN
[FreeTextEntry1] : 68 PT HERE FOR FOLLOW UP    PT WAS ON IMBRUVUCA FROM ONOCLOGY BUT IT HAS BEEN DISCCONTINUED NO CP  AND FEELS MUCH BETTER OFF THE CHEMO  NO FEVERS OR CHIILS SEES DR FAGAN WHO  TOLD PT THAT SPLEEN IS ENLARGED  AND HE MAY NEED MORE CHEMO PT FEELSOK HAS LOST A LITTLE WT BRNGS BLOOD WORK WITH HIM HE HAD RECENTLY AT ONCOLOGY NO CHEST CHRONIC PAIN  PT HAS BEEN ON VICODN FOR YEARS FOR CHROIC PAIN  AND DOES NTO ABUSE THEM HE NEVER CALLS EARLY FOR PILLS  WILL RENEW MEDS WILL FOLLOW UP 3 MONTHS

## 2024-03-21 NOTE — HISTORY OF PRESENT ILLNESS
[FreeTextEntry1] : FOLLOW UP ON  LYMPHOM AAND CHRONIC PAIN  [de-identified] : 68 PT HERE FOR FOLLOW UP    PT WAS ON IMBRUVUCA FROM ONOCLOGY BUT IT HAS BEEN DISCCONTINUED NO CP  AND FEELS MUCH BETTER OFF THE CHEMO  NO FEVERS OR CHIILS SEES DR FAGAN WHO  TOLD PT THAT SPLEEN IS ENLARGED  AND HE MAY NEED MORE CHEMO PT FEELSOK HAS LOST A LITTLE WT BRNGS BLOOD WORK WITH HIM HE HAD RECENTLY AT ONCOLOGY NO CHEST CHRONIC PAIN

## 2024-03-21 NOTE — PHYSICAL EXAM
[No Acute Distress] : no acute distress [Well Nourished] : well nourished [Well Developed] : well developed [Well-Appearing] : well-appearing [Normal Sclera/Conjunctiva] : normal sclera/conjunctiva [PERRL] : pupils equal round and reactive to light [EOMI] : extraocular movements intact [Normal Oropharynx] : the oropharynx was normal [Normal Outer Ear/Nose] : the outer ears and nose were normal in appearance [No JVD] : no jugular venous distention [No Lymphadenopathy] : no lymphadenopathy [Supple] : supple [Thyroid Normal, No Nodules] : the thyroid was normal and there were no nodules present [No Respiratory Distress] : no respiratory distress  [No Accessory Muscle Use] : no accessory muscle use [Clear to Auscultation] : lungs were clear to auscultation bilaterally [Normal Rate] : normal rate  [Regular Rhythm] : with a regular rhythm [Normal S1, S2] : normal S1 and S2 [No Murmur] : no murmur heard [No Abdominal Bruit] : a ~M bruit was not heard ~T in the abdomen [No Carotid Bruits] : no carotid bruits [Pedal Pulses Present] : the pedal pulses are present [No Varicosities] : no varicosities [No Edema] : there was no peripheral edema [No Palpable Aorta] : no palpable aorta [No Extremity Clubbing/Cyanosis] : no extremity clubbing/cyanosis [Non Tender] : non-tender [Soft] : abdomen soft [Non-distended] : non-distended [No Masses] : no abdominal mass palpated [No HSM] : no HSM [Normal Bowel Sounds] : normal bowel sounds [Normal Posterior Cervical Nodes] : no posterior cervical lymphadenopathy [Normal Anterior Cervical Nodes] : no anterior cervical lymphadenopathy [No CVA Tenderness] : no CVA  tenderness [No Spinal Tenderness] : no spinal tenderness [No Joint Swelling] : no joint swelling [Grossly Normal Strength/Tone] : grossly normal strength/tone [No Rash] : no rash [Coordination Grossly Intact] : coordination grossly intact [No Focal Deficits] : no focal deficits [Deep Tendon Reflexes (DTR)] : deep tendon reflexes were 2+ and symmetric [Normal Gait] : normal gait [Normal Affect] : the affect was normal [Normal Insight/Judgement] : insight and judgment were intact

## 2024-03-25 ENCOUNTER — NON-APPOINTMENT (OUTPATIENT)
Age: 69
End: 2024-03-25

## 2024-03-25 ENCOUNTER — APPOINTMENT (OUTPATIENT)
Dept: OPHTHALMOLOGY | Facility: CLINIC | Age: 69
End: 2024-03-25
Payer: MEDICARE

## 2024-03-25 PROCEDURE — 92014 COMPRE OPH EXAM EST PT 1/>: CPT

## 2024-03-25 PROCEDURE — 92133 CPTRZD OPH DX IMG PST SGM ON: CPT

## 2024-04-25 RX ORDER — LANCETS 30 GAUGE
EACH MISCELLANEOUS
Qty: 1 | Refills: 3 | Status: ACTIVE | COMMUNITY
Start: 2021-05-23 | End: 1900-01-01

## 2024-04-25 RX ORDER — BLOOD SUGAR DIAGNOSTIC
STRIP MISCELLANEOUS
Qty: 100 | Refills: 2 | Status: ACTIVE | COMMUNITY
Start: 2019-07-25 | End: 1900-01-01

## 2024-04-25 RX ORDER — TRIAMCINOLONE ACETONIDE 5 MG/G
0.5 CREAM TOPICAL TWICE DAILY
Qty: 1 | Refills: 5 | Status: ACTIVE | COMMUNITY
Start: 1900-01-01 | End: 1900-01-01

## 2024-06-06 ENCOUNTER — APPOINTMENT (OUTPATIENT)
Dept: INTERNAL MEDICINE | Facility: CLINIC | Age: 69
End: 2024-06-06
Payer: MEDICARE

## 2024-06-06 VITALS
OXYGEN SATURATION: 99 % | HEIGHT: 72 IN | HEART RATE: 66 BPM | DIASTOLIC BLOOD PRESSURE: 74 MMHG | SYSTOLIC BLOOD PRESSURE: 142 MMHG | BODY MASS INDEX: 25.47 KG/M2 | WEIGHT: 188 LBS

## 2024-06-06 DIAGNOSIS — E78.00 PURE HYPERCHOLESTEROLEMIA, UNSPECIFIED: ICD-10-CM

## 2024-06-06 DIAGNOSIS — M54.9 DORSALGIA, UNSPECIFIED: ICD-10-CM

## 2024-06-06 DIAGNOSIS — C91.10 CHRONIC LYMPHOCYTIC LEUKEMIA OF B-CELL TYPE NOT HAVING ACHIEVED REMISSION: ICD-10-CM

## 2024-06-06 PROCEDURE — G2211 COMPLEX E/M VISIT ADD ON: CPT

## 2024-06-06 PROCEDURE — 99214 OFFICE O/P EST MOD 30 MIN: CPT

## 2024-06-06 RX ORDER — HYDROCODONE BITARTRATE AND ACETAMINOPHEN 10; 325 MG/1; MG/1
10-325 TABLET ORAL EVERY 6 HOURS
Qty: 270 | Refills: 0 | Status: ACTIVE | COMMUNITY
Start: 2023-07-10 | End: 1900-01-01

## 2024-06-06 RX ORDER — ZANUBRUTINIB 80 MG/1
CAPSULE, GELATIN COATED ORAL
Refills: 0 | Status: ACTIVE | COMMUNITY

## 2024-06-06 RX ORDER — HYDROCODONE BITARTRATE AND ACETAMINOPHEN 7.5; 325 MG/1; MG/1
7.5-325 TABLET ORAL
Qty: 270 | Refills: 0 | Status: DISCONTINUED | COMMUNITY
End: 2024-06-06

## 2024-06-06 RX ORDER — LEVOFLOXACIN 500 MG/1
500 TABLET, FILM COATED ORAL DAILY
Qty: 7 | Refills: 0 | Status: DISCONTINUED | COMMUNITY
Start: 2023-08-30 | End: 2024-06-06

## 2024-06-06 NOTE — HISTORY OF PRESENT ILLNESS
[FreeTextEntry1] : FOLOWUP ON HTN ADN CLL  [de-identified] : 68 PT HERE FOR FOLLOW UP    PT WAS ON IMBRUVUCA FROM ONOCLOGY BUT IT HAS BEEN DISCCONTINUED NOW ON BRUKINSA NO CP  AND FEELS MUCH BETTER    NO FEVERS OR CHIILS SEES DR FAGAN  PT FEELSOK HAD LOST A LITTLE WT  BUT APPEASR TO HAVE GAINE SOME BRNGS BLOOD WORK WITH HIM HE HAD RECENTLY AT ONCOLOGY NO CHEST  HAS CHRONIC PAIN ON HYDROCODONE FOR  YEARS DOES NOT ABUSE IT

## 2024-06-06 NOTE — PLAN
[FreeTextEntry1] : 68 PT HERE FOR FOLLOW UP    PT WAS ON IMBRUVUCA FROM ONOCLOGY BUT IT HAS BEEN DISCCONTINUED NOW ON BRUKINSA NO CP  AND FEELS MUCH BETTER    NO FEVERS OR CHIILS SEES DR FAGAN  PT FEELSOK HAD LOST A LITTLE WT  BUT APPEASR TO HAVE GAINE SOME BRNGS BLOOD WORK WITH HIM HE HAD RECENTLY AT ONCOLOGY NO CHEST  HAS CHRONIC PAIN ON HYDROCODONE FOR  YEARS DOES NOT ABUSE IT WILL CHEK LABS WITH ONCOLOGY DEFER LABS TODAY RENWED PAIN MEDSON HYDROCODONE 10 MG NEVER CALL FOR IT AHEAD OF TIME

## 2024-06-17 RX ORDER — AZITHROMYCIN 250 MG/1
250 TABLET, FILM COATED ORAL
Qty: 1 | Refills: 0 | Status: ACTIVE | COMMUNITY
Start: 2024-06-17 | End: 1900-01-01

## 2024-08-05 ENCOUNTER — APPOINTMENT (OUTPATIENT)
Dept: OPHTHALMOLOGY | Facility: CLINIC | Age: 69
End: 2024-08-05

## 2024-08-05 ENCOUNTER — NON-APPOINTMENT (OUTPATIENT)
Age: 69
End: 2024-08-05

## 2024-08-05 PROCEDURE — 92083 EXTENDED VISUAL FIELD XM: CPT

## 2024-08-05 PROCEDURE — 92012 INTRM OPH EXAM EST PATIENT: CPT

## 2024-08-26 ENCOUNTER — NON-APPOINTMENT (OUTPATIENT)
Age: 69
End: 2024-08-26

## 2024-08-26 ENCOUNTER — APPOINTMENT (OUTPATIENT)
Dept: OPHTHALMOLOGY | Facility: CLINIC | Age: 69
End: 2024-08-26

## 2024-08-27 ENCOUNTER — APPOINTMENT (OUTPATIENT)
Dept: OPHTHALMOLOGY | Facility: CLINIC | Age: 69
End: 2024-08-27
Payer: MEDICARE

## 2024-08-27 ENCOUNTER — NON-APPOINTMENT (OUTPATIENT)
Age: 69
End: 2024-08-27

## 2024-08-27 PROCEDURE — 92012 INTRM OPH EXAM EST PATIENT: CPT

## 2024-09-05 ENCOUNTER — APPOINTMENT (OUTPATIENT)
Dept: INTERNAL MEDICINE | Facility: CLINIC | Age: 69
End: 2024-09-05
Payer: MEDICARE

## 2024-09-05 VITALS
DIASTOLIC BLOOD PRESSURE: 75 MMHG | BODY MASS INDEX: 25.73 KG/M2 | HEIGHT: 72 IN | WEIGHT: 190 LBS | SYSTOLIC BLOOD PRESSURE: 154 MMHG | HEART RATE: 76 BPM

## 2024-09-05 DIAGNOSIS — Z23 ENCOUNTER FOR IMMUNIZATION: ICD-10-CM

## 2024-09-05 DIAGNOSIS — E78.00 PURE HYPERCHOLESTEROLEMIA, UNSPECIFIED: ICD-10-CM

## 2024-09-05 DIAGNOSIS — C91.10 CHRONIC LYMPHOCYTIC LEUKEMIA OF B-CELL TYPE NOT HAVING ACHIEVED REMISSION: ICD-10-CM

## 2024-09-05 DIAGNOSIS — M19.90 UNSPECIFIED OSTEOARTHRITIS, UNSPECIFIED SITE: ICD-10-CM

## 2024-09-05 DIAGNOSIS — D64.9 ANEMIA, UNSPECIFIED: ICD-10-CM

## 2024-09-05 PROCEDURE — 90662 IIV NO PRSV INCREASED AG IM: CPT

## 2024-09-05 PROCEDURE — G0008: CPT

## 2024-09-05 PROCEDURE — 99215 OFFICE O/P EST HI 40 MIN: CPT

## 2024-09-06 PROBLEM — Z23 FLU VACCINE NEED: Status: ACTIVE | Noted: 2024-09-06

## 2024-09-06 NOTE — HISTORY OF PRESENT ILLNESS
[FreeTextEntry1] : FOLLOWUP ON HTN  [de-identified] : 68 PMH OF CLL   FOR FOLLOW UP    PT WAS ON IMBRUVUCA FROM ONOCLOGY BUT IT HAS BEEN DISCCONTINUED NOW ON BRUKINSA SEES [place] [facility]LOOD [facility]  NO CP  AND FEELS MUCH BETTER    NO FEVERS OR CHIILS SEES DR FAGAN  PT FEELSOK HAD LOST A LITTLE WT  BUT APPEASR TO HAVE GAINE SOME BACK'HE REPORTS HE HAD RECENT CT SCN WITH IMPROVEMENT NO CHEST  PAIN  HAS CHRONIC PAIN ON HYDROCODONE FOR  YEARS DOES NOT ABUSE IT

## 2024-09-06 NOTE — PLAN
[FreeTextEntry1] : 68 PMH OF CLL   FOR FOLLOW UP    PT WAS ON IMBRUVUCA FROM ONOCLOGY BUT IT HAS BEEN DISCCONTINUED NOW ON BRUKINSA SEES [place] BLOOD [facility] CANCER  NO CP  AND FEELS MUCH BETTER    NO FEVERS OR CHIILS SEES DR FAGAN  PT FEELSOK HAD LOST A LITTLE WT  BUT APPEASR TO HAVE GAINE SOME BACK'HE REPORTS HE HAD RECENT CT SCN WITH IMPROVEMENT NO CHEST  PAIN  HAS CHRONIC PAIN ON HYDROCODONE FOR  YEARS DOES NOT ABUSE IT WILL AWAIT OFFICIAL REPORT FROM ONCOLOGY PT KRISHAN HAD LABS PT REQUESTS FLU SHOT HE DOES NOT TRUST PHARMACY WILL GIVE TODAY

## 2024-09-06 NOTE — HISTORY OF PRESENT ILLNESS
[FreeTextEntry1] : FOLLOWUP ON HTN  [de-identified] : 68 PMH OF CLL   FOR FOLLOW UP    PT WAS ON IMBRUVUCA FROM ONOCLOGY BUT IT HAS BEEN DISCCONTINUED NOW ON BRUKINSA SEES [place] [facility]LOOD [facility]  NO CP  AND FEELS MUCH BETTER    NO FEVERS OR CHIILS SEES DR FAGAN  PT FEELSOK HAD LOST A LITTLE WT  BUT APPEASR TO HAVE GAINE SOME BACK'HE REPORTS HE HAD RECENT CT SCN WITH IMPROVEMENT NO CHEST  PAIN  HAS CHRONIC PAIN ON HYDROCODONE FOR  YEARS DOES NOT ABUSE IT

## 2024-09-19 ENCOUNTER — APPOINTMENT (OUTPATIENT)
Dept: INTERNAL MEDICINE | Facility: CLINIC | Age: 69
End: 2024-09-19
Payer: MEDICARE

## 2024-09-19 VITALS
DIASTOLIC BLOOD PRESSURE: 65 MMHG | WEIGHT: 184 LBS | BODY MASS INDEX: 24.92 KG/M2 | HEIGHT: 72 IN | SYSTOLIC BLOOD PRESSURE: 150 MMHG

## 2024-09-19 DIAGNOSIS — C91.10 CHRONIC LYMPHOCYTIC LEUKEMIA OF B-CELL TYPE NOT HAVING ACHIEVED REMISSION: ICD-10-CM

## 2024-09-19 DIAGNOSIS — E11.9 TYPE 2 DIABETES MELLITUS W/OUT COMPLICATIONS: ICD-10-CM

## 2024-09-19 DIAGNOSIS — R05.9 COUGH, UNSPECIFIED: ICD-10-CM

## 2024-09-19 DIAGNOSIS — M19.90 UNSPECIFIED OSTEOARTHRITIS, UNSPECIFIED SITE: ICD-10-CM

## 2024-09-19 PROCEDURE — 99215 OFFICE O/P EST HI 40 MIN: CPT

## 2024-09-19 PROCEDURE — 36415 COLL VENOUS BLD VENIPUNCTURE: CPT

## 2024-09-19 RX ORDER — METFORMIN HYDROCHLORIDE 500 MG/1
500 TABLET, COATED ORAL
Qty: 270 | Refills: 2 | Status: ACTIVE | COMMUNITY
Start: 2024-09-19 | End: 1900-01-01

## 2024-09-19 NOTE — HISTORY OF PRESENT ILLNESS
[FreeTextEntry1] : FOLLOWUP ON HYPERGLYCEMIA [de-identified] : 68 PMH OF CLL   FOR FOLLOW UP    PT WAS ON IMBRUVUCA FROM ONOCLOGY BUT IT HAS BEEN DISCCONTINUED NOW ON BRUKINSA SEES [place] [facility]LO[facility]  NO CP    SEES DR FAGAN   WHO NOTED BLOD SUGAR LST VISIT  AND PT STARTED METFOMRIN HERE FOR FOLLWUP  PT UPSET THAT HE IS BACK ON DM MEDS AS HE HAS BEEN OFF FOR A LONG TIME

## 2024-09-19 NOTE — PLAN
[FreeTextEntry1] : 68 PMH OF CLL   FOR FOLLOW UP    PT WAS ON IMBRUVUCA FROM ONOCLOGY BUT IT HAS BEEN DISCCONTINUED NOW ON BRUKINSA SEES [place] BLOOD [facility] CANCER  NO CP    SEES DR FAGAN   WHO NOTED BLOD SUGAR LST VISIT  AND PT STARTED METFOMRIN HERE FOR FOLLWUP  PT UPSET THAT HE IS BACK ON DM MEDS AS HE HAS BEEN OFF FOR A LONG TIME HE IS UNHAPPY HE IS OPN SO MANY PILLS AND UPSET ABOUT SDIE EFFECTOF THE CHEMO HE BRINGS IN THE LOG OF FSG AT HOME    WILL INCREAE FSG TO 1000 IN AM  IN PM WILL CHECK AA1 AS IN APRIL IST WAS 5.9 WILL FOLLOWUP  REASSURANCE GIVEN WILL Dc GEMFIBRIOZOL AS TG HAVE BEEN GOOD AND PT DESPERATELY  WANTS  TO C UT BACK ON SOME MEDS

## 2024-09-20 LAB
ESTIMATED AVERAGE GLUCOSE: 206 MG/DL
HBA1C MFR BLD HPLC: 8.8 %

## 2024-11-21 ENCOUNTER — APPOINTMENT (OUTPATIENT)
Dept: INTERNAL MEDICINE | Facility: CLINIC | Age: 69
End: 2024-11-21

## 2024-12-05 ENCOUNTER — APPOINTMENT (OUTPATIENT)
Dept: OPHTHALMOLOGY | Facility: CLINIC | Age: 69
End: 2024-12-05
Payer: MEDICARE

## 2024-12-05 ENCOUNTER — NON-APPOINTMENT (OUTPATIENT)
Age: 69
End: 2024-12-05

## 2024-12-05 PROCEDURE — 92014 COMPRE OPH EXAM EST PT 1/>: CPT

## 2024-12-05 PROCEDURE — 92133 CPTRZD OPH DX IMG PST SGM ON: CPT

## 2024-12-12 ENCOUNTER — APPOINTMENT (OUTPATIENT)
Dept: INTERNAL MEDICINE | Facility: CLINIC | Age: 69
End: 2024-12-12
Payer: MEDICARE

## 2024-12-12 VITALS
DIASTOLIC BLOOD PRESSURE: 70 MMHG | HEIGHT: 72 IN | SYSTOLIC BLOOD PRESSURE: 130 MMHG | WEIGHT: 195 LBS | BODY MASS INDEX: 26.41 KG/M2

## 2024-12-12 DIAGNOSIS — I10 ESSENTIAL (PRIMARY) HYPERTENSION: ICD-10-CM

## 2024-12-12 DIAGNOSIS — E11.9 TYPE 2 DIABETES MELLITUS W/OUT COMPLICATIONS: ICD-10-CM

## 2024-12-12 DIAGNOSIS — E78.00 PURE HYPERCHOLESTEROLEMIA, UNSPECIFIED: ICD-10-CM

## 2024-12-12 DIAGNOSIS — M19.90 UNSPECIFIED OSTEOARTHRITIS, UNSPECIFIED SITE: ICD-10-CM

## 2024-12-12 DIAGNOSIS — C91.10 CHRONIC LYMPHOCYTIC LEUKEMIA OF B-CELL TYPE NOT HAVING ACHIEVED REMISSION: ICD-10-CM

## 2024-12-12 PROCEDURE — 99215 OFFICE O/P EST HI 40 MIN: CPT

## 2024-12-12 PROCEDURE — 36415 COLL VENOUS BLD VENIPUNCTURE: CPT

## 2024-12-16 LAB
ESTIMATED AVERAGE GLUCOSE: 146 MG/DL
HBA1C MFR BLD HPLC: 6.7 %
PSA SERPL-MCNC: 0.11 NG/ML

## 2025-03-05 ENCOUNTER — RX RENEWAL (OUTPATIENT)
Age: 70
End: 2025-03-05

## 2025-03-13 ENCOUNTER — APPOINTMENT (OUTPATIENT)
Dept: INTERNAL MEDICINE | Facility: CLINIC | Age: 70
End: 2025-03-13
Payer: MEDICARE

## 2025-03-13 VITALS
DIASTOLIC BLOOD PRESSURE: 84 MMHG | HEART RATE: 73 BPM | HEIGHT: 72 IN | SYSTOLIC BLOOD PRESSURE: 124 MMHG | BODY MASS INDEX: 26.68 KG/M2 | RESPIRATION RATE: 15 BRPM | WEIGHT: 197 LBS

## 2025-03-13 DIAGNOSIS — M54.9 DORSALGIA, UNSPECIFIED: ICD-10-CM

## 2025-03-13 DIAGNOSIS — E78.00 PURE HYPERCHOLESTEROLEMIA, UNSPECIFIED: ICD-10-CM

## 2025-03-13 DIAGNOSIS — C91.10 CHRONIC LYMPHOCYTIC LEUKEMIA OF B-CELL TYPE NOT HAVING ACHIEVED REMISSION: ICD-10-CM

## 2025-03-13 DIAGNOSIS — D64.9 ANEMIA, UNSPECIFIED: ICD-10-CM

## 2025-03-13 DIAGNOSIS — M19.90 UNSPECIFIED OSTEOARTHRITIS, UNSPECIFIED SITE: ICD-10-CM

## 2025-03-13 DIAGNOSIS — E11.9 TYPE 2 DIABETES MELLITUS W/OUT COMPLICATIONS: ICD-10-CM

## 2025-03-13 PROCEDURE — 99203 OFFICE O/P NEW LOW 30 MIN: CPT

## 2025-03-13 PROCEDURE — 36415 COLL VENOUS BLD VENIPUNCTURE: CPT

## 2025-03-13 PROCEDURE — 99213 OFFICE O/P EST LOW 20 MIN: CPT

## 2025-03-16 LAB
CHOLEST SERPL-MCNC: 131 MG/DL
ESTIMATED AVERAGE GLUCOSE: 157 MG/DL
HBA1C MFR BLD HPLC: 7.1 %
HDLC SERPL-MCNC: 34 MG/DL
LDLC SERPL CALC-MCNC: 71 MG/DL
NONHDLC SERPL-MCNC: 97 MG/DL
TRIGL SERPL-MCNC: 144 MG/DL

## 2025-04-09 ENCOUNTER — APPOINTMENT (OUTPATIENT)
Dept: OPHTHALMOLOGY | Facility: CLINIC | Age: 70
End: 2025-04-09
Payer: MEDICARE

## 2025-04-09 ENCOUNTER — NON-APPOINTMENT (OUTPATIENT)
Age: 70
End: 2025-04-09

## 2025-04-09 PROCEDURE — 92012 INTRM OPH EXAM EST PATIENT: CPT

## 2025-04-09 PROCEDURE — 92083 EXTENDED VISUAL FIELD XM: CPT

## 2025-06-05 ENCOUNTER — APPOINTMENT (OUTPATIENT)
Dept: INTERNAL MEDICINE | Facility: CLINIC | Age: 70
End: 2025-06-05

## 2025-06-05 VITALS
BODY MASS INDEX: 26.82 KG/M2 | SYSTOLIC BLOOD PRESSURE: 180 MMHG | DIASTOLIC BLOOD PRESSURE: 70 MMHG | WEIGHT: 198 LBS | HEIGHT: 72 IN

## 2025-06-05 PROCEDURE — G2211 COMPLEX E/M VISIT ADD ON: CPT

## 2025-06-05 PROCEDURE — 36415 COLL VENOUS BLD VENIPUNCTURE: CPT

## 2025-06-05 PROCEDURE — 99215 OFFICE O/P EST HI 40 MIN: CPT

## 2025-06-09 LAB
ESTIMATED AVERAGE GLUCOSE: 183 MG/DL
HBA1C MFR BLD HPLC: 8 %

## 2025-06-10 RX ORDER — GLIMEPIRIDE 1 MG/1
1 TABLET ORAL DAILY
Qty: 30 | Refills: 3 | Status: ACTIVE | COMMUNITY
Start: 2025-06-10 | End: 1900-01-01

## 2025-07-02 ENCOUNTER — RX CHANGE (OUTPATIENT)
Age: 70
End: 2025-07-02

## 2025-07-02 RX ORDER — GLIMEPIRIDE 1 MG/1
1 TABLET ORAL
Qty: 90 | Refills: 2 | Status: ACTIVE | COMMUNITY
Start: 1900-01-01 | End: 1900-01-01

## 2025-07-30 RX ORDER — HYDROCHLOROTHIAZIDE 12.5 MG/1
12.5 TABLET ORAL
Qty: 90 | Refills: 3 | Status: ACTIVE | COMMUNITY
Start: 2025-07-30 | End: 1900-01-01

## 2025-08-07 ENCOUNTER — APPOINTMENT (OUTPATIENT)
Dept: OPHTHALMOLOGY | Facility: CLINIC | Age: 70
End: 2025-08-07
Payer: MEDICARE

## 2025-08-07 ENCOUNTER — NON-APPOINTMENT (OUTPATIENT)
Age: 70
End: 2025-08-07

## 2025-08-07 PROCEDURE — 92014 COMPRE OPH EXAM EST PT 1/>: CPT

## 2025-08-07 PROCEDURE — 92133 CPTRZD OPH DX IMG PST SGM ON: CPT

## 2025-08-25 ENCOUNTER — RX CHANGE (OUTPATIENT)
Age: 70
End: 2025-08-25

## 2025-08-25 RX ORDER — GLIMEPIRIDE 1 MG/1
1 TABLET ORAL
Qty: 90 | Refills: 3 | Status: ACTIVE | COMMUNITY
Start: 1900-01-01 | End: 1900-01-01

## 2025-09-02 ENCOUNTER — NON-APPOINTMENT (OUTPATIENT)
Age: 70
End: 2025-09-02

## 2025-09-04 ENCOUNTER — APPOINTMENT (OUTPATIENT)
Dept: INTERNAL MEDICINE | Facility: CLINIC | Age: 70
End: 2025-09-04
Payer: MEDICARE

## 2025-09-04 VITALS
DIASTOLIC BLOOD PRESSURE: 70 MMHG | HEIGHT: 72 IN | SYSTOLIC BLOOD PRESSURE: 150 MMHG | BODY MASS INDEX: 27.5 KG/M2 | WEIGHT: 203 LBS

## 2025-09-04 DIAGNOSIS — C91.10 CHRONIC LYMPHOCYTIC LEUKEMIA OF B-CELL TYPE NOT HAVING ACHIEVED REMISSION: ICD-10-CM

## 2025-09-04 DIAGNOSIS — E11.9 TYPE 2 DIABETES MELLITUS W/OUT COMPLICATIONS: ICD-10-CM

## 2025-09-04 DIAGNOSIS — D64.9 ANEMIA, UNSPECIFIED: ICD-10-CM

## 2025-09-04 PROCEDURE — 99215 OFFICE O/P EST HI 40 MIN: CPT

## 2025-09-04 PROCEDURE — G2211 COMPLEX E/M VISIT ADD ON: CPT

## 2025-09-04 PROCEDURE — G0008: CPT

## 2025-09-04 PROCEDURE — 90662 IIV NO PRSV INCREASED AG IM: CPT

## 2025-09-04 PROCEDURE — 36415 COLL VENOUS BLD VENIPUNCTURE: CPT

## 2025-09-10 LAB
CHOLEST SERPL-MCNC: 140 MG/DL
ESTIMATED AVERAGE GLUCOSE: 160 MG/DL
HBA1C MFR BLD HPLC: 7.2 %
HDLC SERPL-MCNC: 33 MG/DL
LDLC SERPL-MCNC: 75 MG/DL
NONHDLC SERPL-MCNC: 107 MG/DL
TRIGL SERPL-MCNC: 184 MG/DL

## 2025-09-18 ENCOUNTER — RX CHANGE (OUTPATIENT)
Age: 70
End: 2025-09-18

## 2025-09-18 RX ORDER — HYDROCHLOROTHIAZIDE 12.5 MG/1
12.5 TABLET ORAL
Qty: 90 | Refills: 3 | Status: ACTIVE | COMMUNITY
Start: 1900-01-01 | End: 1900-01-01